# Patient Record
Sex: FEMALE | Race: WHITE | Employment: FULL TIME | URBAN - METROPOLITAN AREA
[De-identification: names, ages, dates, MRNs, and addresses within clinical notes are randomized per-mention and may not be internally consistent; named-entity substitution may affect disease eponyms.]

---

## 2017-10-23 ENCOUNTER — OFFICE VISIT (OUTPATIENT)
Dept: INTERNAL MEDICINE CLINIC | Age: 37
End: 2017-10-23

## 2017-10-23 VITALS
HEART RATE: 78 BPM | WEIGHT: 122.6 LBS | HEIGHT: 66 IN | DIASTOLIC BLOOD PRESSURE: 74 MMHG | SYSTOLIC BLOOD PRESSURE: 112 MMHG | BODY MASS INDEX: 19.7 KG/M2 | OXYGEN SATURATION: 96 %

## 2017-10-23 DIAGNOSIS — Z23 NEED FOR PROPHYLACTIC VACCINATION AGAINST STREPTOCOCCUS PNEUMONIAE (PNEUMOCOCCUS): ICD-10-CM

## 2017-10-23 DIAGNOSIS — F41.9 ANXIETY: ICD-10-CM

## 2017-10-23 DIAGNOSIS — Z76.89 ENCOUNTER TO ESTABLISH CARE: Primary | ICD-10-CM

## 2017-10-23 DIAGNOSIS — Z23 NEED FOR PROPHYLACTIC VACCINATION AGAINST DIPHTHERIA-TETANUS-PERTUSSIS (DTP): ICD-10-CM

## 2017-10-23 DIAGNOSIS — M79.672 LEFT FOOT PAIN: ICD-10-CM

## 2017-10-23 PROCEDURE — 99204 OFFICE O/P NEW MOD 45 MIN: CPT | Performed by: NURSE PRACTITIONER

## 2017-10-23 PROCEDURE — 90715 TDAP VACCINE 7 YRS/> IM: CPT | Performed by: NURSE PRACTITIONER

## 2017-10-23 PROCEDURE — 90472 IMMUNIZATION ADMIN EACH ADD: CPT | Performed by: NURSE PRACTITIONER

## 2017-10-23 PROCEDURE — 90471 IMMUNIZATION ADMIN: CPT | Performed by: NURSE PRACTITIONER

## 2017-10-23 PROCEDURE — 4004F PT TOBACCO SCREEN RCVD TLK: CPT | Performed by: NURSE PRACTITIONER

## 2017-10-23 PROCEDURE — G8420 CALC BMI NORM PARAMETERS: HCPCS | Performed by: NURSE PRACTITIONER

## 2017-10-23 PROCEDURE — G8427 DOCREV CUR MEDS BY ELIG CLIN: HCPCS | Performed by: NURSE PRACTITIONER

## 2017-10-23 PROCEDURE — G8484 FLU IMMUNIZE NO ADMIN: HCPCS | Performed by: NURSE PRACTITIONER

## 2017-10-23 PROCEDURE — 90732 PPSV23 VACC 2 YRS+ SUBQ/IM: CPT | Performed by: NURSE PRACTITIONER

## 2017-10-23 RX ORDER — IBUPROFEN 200 MG
200 TABLET ORAL EVERY 6 HOURS PRN
COMMUNITY
End: 2018-04-23 | Stop reason: SDUPTHER

## 2017-10-23 RX ORDER — BUSPIRONE HYDROCHLORIDE 10 MG/1
10 TABLET ORAL 2 TIMES DAILY
Qty: 60 TABLET | Refills: 0 | Status: SHIPPED | OUTPATIENT
Start: 2017-10-23 | End: 2017-12-29 | Stop reason: SDUPTHER

## 2017-10-23 ASSESSMENT — PATIENT HEALTH QUESTIONNAIRE - PHQ9
2. FEELING DOWN, DEPRESSED OR HOPELESS: 0
SUM OF ALL RESPONSES TO PHQ9 QUESTIONS 1 & 2: 0
SUM OF ALL RESPONSES TO PHQ QUESTIONS 1-9: 0
1. LITTLE INTEREST OR PLEASURE IN DOING THINGS: 0

## 2017-10-23 NOTE — PATIENT INSTRUCTIONS
Patient Education        Foot Pain: Care Instructions  Your Care Instructions  Foot injuries that cause pain and swelling are fairly common. Almost all sports or home repair projects can cause a misstep that ends up as foot pain. Normal wear and tear, especially as you get older, also can cause foot pain. Most minor foot injuries will heal on their own, and home treatment is usually all you need to do. If you have a severe injury, you may need tests and treatment. Follow-up care is a key part of your treatment and safety. Be sure to make and go to all appointments, and call your doctor if you are having problems. Its also a good idea to know your test results and keep a list of the medicines you take. How can you care for yourself at home? · Take pain medicines exactly as directed. ¨ If the doctor gave you a prescription medicine for pain, take it as prescribed. ¨ If you are not taking a prescription pain medicine, ask your doctor if you can take an over-the-counter medicine. · Rest and protect your foot. Take a break from any activity that may cause pain. · Put ice or a cold pack on your foot for 10 to 20 minutes at a time. Put a thin cloth between the ice and your skin. · Prop up the sore foot on a pillow when you ice it or anytime you sit or lie down during the next 3 days. Try to keep it above the level of your heart. This will help reduce swelling. · Your doctor may recommend that you wrap your foot with an elastic bandage. Keep your foot wrapped for as long as your doctor advises. · If your doctor recommends crutches, use them as directed. · Wear roomy footwear. · As soon as pain and swelling end, begin gentle exercises of your foot. Your doctor can tell you which exercises will help. When should you call for help? Call 911 anytime you think you may need emergency care. For example, call if:  · Your foot turns pale, white, blue, or cold.   Call your doctor now or seek immediate medical care if:  · You cannot move or stand on your foot. · Your foot looks twisted or out of its normal position. · Your foot is not stable when you step down. · You have signs of infection, such as:  ¨ Increased pain, swelling, warmth, or redness. ¨ Red streaks leading from the sore area. ¨ Pus draining from a place on your foot. ¨ A fever. · Your foot is numb or tingly. Watch closely for changes in your health, and be sure to contact your doctor if:  · You do not get better as expected. · You have bruises from an injury that last longer than 2 weeks. Where can you learn more? Go to [email or web address] [email or web address] org and sign in to your [employer] account. Enter R878 in the B[employer] box to learn more about \"Foot Pain: Care Instructions. \"     If you do not have an account, please click on the \"Sign Up Now\" link. Current as of: March 21, 2017  Content Version: 11.3  © 6330-4278 [employer]. Care instructions adapted under license by [facility] ([facility]). If you have questions about a medical condition or this instruction, always ask your healthcare professional. [place] [address] any warranty or liability for your use of this information.

## 2017-10-30 ENCOUNTER — OFFICE VISIT (OUTPATIENT)
Dept: ORTHOPEDIC SURGERY | Age: 37
End: 2017-10-30

## 2017-10-30 VITALS — WEIGHT: 120 LBS | RESPIRATION RATE: 16 BRPM | HEIGHT: 65 IN | BODY MASS INDEX: 19.99 KG/M2

## 2017-10-30 DIAGNOSIS — M72.2 PLANTAR FASCIITIS: Primary | ICD-10-CM

## 2017-10-30 DIAGNOSIS — R52 PAIN: ICD-10-CM

## 2017-10-30 PROCEDURE — G8420 CALC BMI NORM PARAMETERS: HCPCS | Performed by: PHYSICIAN ASSISTANT

## 2017-10-30 PROCEDURE — 73630 X-RAY EXAM OF FOOT: CPT | Performed by: PHYSICIAN ASSISTANT

## 2017-10-30 PROCEDURE — G8484 FLU IMMUNIZE NO ADMIN: HCPCS | Performed by: PHYSICIAN ASSISTANT

## 2017-10-30 PROCEDURE — G8427 DOCREV CUR MEDS BY ELIG CLIN: HCPCS | Performed by: PHYSICIAN ASSISTANT

## 2017-10-30 PROCEDURE — 99242 OFF/OP CONSLTJ NEW/EST SF 20: CPT | Performed by: PHYSICIAN ASSISTANT

## 2017-10-30 ASSESSMENT — ENCOUNTER SYMPTOMS
RESPIRATORY NEGATIVE: 1
EYES NEGATIVE: 1
GASTROINTESTINAL NEGATIVE: 1

## 2017-10-30 NOTE — PROGRESS NOTES
above-      PAST HISTORY:   Unless otherwise specified in this note, the past history is reviewed today with the patient and is as follows-    No Known Allergies    Current Outpatient Prescriptions   Medication Sig Dispense Refill    ibuprofen (ADVIL;MOTRIN) 200 MG tablet Take 200 mg by mouth every 6 hours as needed for Pain      busPIRone (BUSPAR) 10 MG tablet Take 1 tablet by mouth 2 times daily 60 tablet 0     No current facility-administered medications for this visit. Past Medical History:   Diagnosis Date    Bone spur 04/2017    left heel of foot    Migraine        Past Surgical History:   Procedure Laterality Date    DILATION AND CURETTAGE OF UTERUS  02/2017       Family History   Problem Relation Age of Onset    No Known Problems Mother     No Known Problems Father     No Known Problems Sister     No Known Problems Brother        Social History     Social History    Marital status: Single     Spouse name: N/A    Number of children: N/A    Years of education: N/A     Social History Main Topics    Smoking status: Current Every Day Smoker     Packs/day: 1.00     Types: Cigarettes     Start date: 1997    Smokeless tobacco: Never Used    Alcohol use Yes      Comment: social-pt states was a heavy drinker    Drug use: No    Sexual activity: Yes     Partners: Male     Other Topics Concern    None     Social History Narrative    None         ORTHOPEDIC CONSTITUTION EXAM:     Vital Signs:  Resp 16   Ht 5' 5\" (1.651 m)   Wt 120 lb (54.4 kg)   LMP 10/09/2017 (Approximate)   BMI 19.97 kg/m²     Constitution:  Generally, the patient is [x] alert, [x] appears stated age, and [x] in no distress. General body habitus is:   []Cachectic  []Thin  []Normal  []Overweight  []Obese  []Morbidly Obese     Psychiatric:   Judgement and insight is:  [x]Good    []Poor  Oriented to:  [x]person, [x]place, [x]time.   Mood for circumstances is:  [x]Appropriate   []Inappropriate    Gait and Station:   Gait is: [x]Normal  []Antalgic   []Trendelenburg   []Wide   []Unsteady   []Other:  Assistive Device:  []Cane    []Crutches    []Walker    []Wheelchair    []Gurney  Weight bearing on injured extremity:  [x]Is able   []Is not able      ORTHOPEDIC FOOT EXAM:     FOOT EXAM:  [] Right [x] Left  Circulation:  [x] The limb is warm and well perfused. [x] Capillary refill is intact. [] Edema:    Inspection:   [x] Skin intact without abrasion or lacerations. [x] Normal ankle / foot alignment:  [] Abnormal alignment:  [] Ecchymosis:  [] Abrasion:  [] Laceration:   [] Scar / Surgical incision:  [] Orthopedic appliance:     Range of Motion:  [x] Normal Foot & Ankle AROM     [x] Normal Foot & Ankle PROM  [] Deferred due to fracture  Active Great Toe Dorsiflexion:  []AROM = PROM   Active Great Toe Plantarflexion:  []AROM = PROM   Active Foot-Ankle Supination:  []AROM = PROM   Active Foot-Ankle Pronation:  []AROM = PROM   Passive Great Toe Dorsiflexion:  []AROM = PROM   Passive Great Toe Plantarflexion:  []AROM = PROM   Passive Foot-Ankle Supination:  []AROM = PROM   Passive Foot-Ankle Pronation:  []AROM = PROM     Motor Function:  [x] No gross motor weakness of ankle [x] No gross motor weakness of great toe  [] Motor weakness:     Neurologic:  - Sensation to light touch intact. - Deep tendon reflexes intact. - Coordination / proprioception intact.       Palpation: (Not tested if not marked)     Normal Tenderness Swelling Crepitation Calor   Medial malleolus - - - - -   Lateral malleolus - - - - -   Peroneal tendons - - - - -   Posterior tibialis tendons - - - - -   Deltoid ligament - - - - -   ATFL - - - - -   Achilles tendon: - - - - -   Achilles insertion: - - - - -   Retrocalcaneal bursa: - - - - -   Calcaneus: tender at site of tendon insertion - - - - -   Talus: - - - - -   Base of 5th metatarsal: - - - - -   Metatarsal shaft #___: - - - - -   Metatarsal head #___: - - - - -   MTP Joint #___: - - - - -   Toe #___: - - - - - Other: - - - - -     Provocative Tests: (Not tested if not marked)   Negative Positive Findings   Ramirez Block Test -    Dorsiflexion-Eversion Test -    Drawer Test -    External Rotation Stress Test -    Inversion Stress Test: -    Silfverskiold test -    Single Heel Rise -    Squeeze Test -    Faustin Test -    Tinel's Sign -      Contralateral Examination:  Examination of the contralateral side is performed for comparison. Unless otherwise specified above, examination of the area does not show any tenderness, deformity or injury. Range of motion is unremarkable. There is no gross instability. There are no rashes, ulcerations or lesions. Strength and tone are normal.      MEDICAL DATA:   Outside record review:  office notes    Imaging: Foot x-ray:  3 view(s) of the left foot were obtained and reviewed and show small osteophyte spur on calcaneus. No fractures or acute deformities noted. ASSESSMENT:     1. Plantar fasciitis     2. Pain  XR FOOT LEFT (MIN 3 VIEWS)         PLAN:   · Diagnostic and treatment options discussed. Diagnosis explained. Natural history discussed. Patient's questions answered. · Physical Therapy ordered. · Fill a 1 or 2 liter bottle with water and freeze it, then roll your foot 20 minutes, 2-3 times daily. · Continue ibuprofen and add Tylenol for pain. · May use heel cups for support. · Follow-up in 4 weeks. Add Tylenol (also known as acetaminophen) as needed   Take 2 extra strength (500 mg) or 3 regular strength (325 mg) 3-4 times a day  It is OK to take Tylenol in conjunction with NSAID's (Advil, Aleve, Naprosyn, etc)  If taking other medications that have acetaminophen ensure total acetaminophen dose for any 24 period is less than 4,000 mg    We greatly appreciate the referral from Brandee Funes CNP, and will send a copy of this note to her office.     Brian Evans PA-C

## 2017-12-29 ENCOUNTER — OFFICE VISIT (OUTPATIENT)
Dept: INTERNAL MEDICINE CLINIC | Age: 37
End: 2017-12-29

## 2017-12-29 VITALS — HEART RATE: 95 BPM | OXYGEN SATURATION: 98 % | DIASTOLIC BLOOD PRESSURE: 76 MMHG | SYSTOLIC BLOOD PRESSURE: 104 MMHG

## 2017-12-29 DIAGNOSIS — F41.9 ANXIETY: ICD-10-CM

## 2017-12-29 DIAGNOSIS — J01.00 ACUTE NON-RECURRENT MAXILLARY SINUSITIS: Primary | ICD-10-CM

## 2017-12-29 PROCEDURE — G8427 DOCREV CUR MEDS BY ELIG CLIN: HCPCS | Performed by: NURSE PRACTITIONER

## 2017-12-29 PROCEDURE — G8420 CALC BMI NORM PARAMETERS: HCPCS | Performed by: NURSE PRACTITIONER

## 2017-12-29 PROCEDURE — 4004F PT TOBACCO SCREEN RCVD TLK: CPT | Performed by: NURSE PRACTITIONER

## 2017-12-29 PROCEDURE — 99213 OFFICE O/P EST LOW 20 MIN: CPT | Performed by: NURSE PRACTITIONER

## 2017-12-29 PROCEDURE — G8484 FLU IMMUNIZE NO ADMIN: HCPCS | Performed by: NURSE PRACTITIONER

## 2017-12-29 RX ORDER — FLUTICASONE PROPIONATE 50 MCG
1 SPRAY, SUSPENSION (ML) NASAL DAILY
Qty: 1 BOTTLE | Refills: 3 | Status: SHIPPED | OUTPATIENT
Start: 2017-12-29 | End: 2018-04-23 | Stop reason: SDUPTHER

## 2017-12-29 RX ORDER — AMOXICILLIN 500 MG/1
500 CAPSULE ORAL 2 TIMES DAILY
Qty: 20 CAPSULE | Refills: 0 | Status: SHIPPED | OUTPATIENT
Start: 2017-12-29 | End: 2018-01-08

## 2017-12-29 RX ORDER — BUSPIRONE HYDROCHLORIDE 10 MG/1
10 TABLET ORAL 2 TIMES DAILY
Qty: 60 TABLET | Refills: 0 | Status: SHIPPED | OUTPATIENT
Start: 2017-12-29 | End: 2018-04-23 | Stop reason: SDUPTHER

## 2017-12-29 NOTE — PROGRESS NOTES
Tyrone Guayamalokesh [facility]  1980  12/29/17    SUBJECTIVE:      Chief Complaint   Patient presents with    Sinus Problem     x 3 weeks, facial pressure       HPI:   Juanis Donis is a 40 y.o. female who presents for evaluation of sinus pain. Symptoms include: congestion, facial pain, headaches, post nasal drip and sore throat. Onset of symptoms was 3 weeks ago. Symptoms have been unchanged since that time. Past history is significant for no history of pneumonia or bronchitis. Patient is a non-smoker. Review of Systems    OBJECTIVE:      /76 (Site: Left Arm, Position: Sitting, Cuff Size: Medium Adult)   Pulse 95   SpO2 98%     Physical Exam   Constitutional: She appears well-developed and well-nourished. HENT:   Right Ear: External ear normal.   Left Ear: External ear normal.   Nose: Rhinorrhea present. Right sinus exhibits maxillary sinus tenderness. Left sinus exhibits maxillary sinus tenderness. Mouth/Throat: No oropharyngeal exudate. Eyes: Conjunctivae are normal. No scleral icterus. Cardiovascular: Normal rate, regular rhythm and normal heart sounds. No murmur heard. Pulmonary/Chest: Effort normal and breath sounds normal. No respiratory distress. She has no wheezes. Lymphadenopathy:     She has no cervical adenopathy. Neurological: She is alert. ASSESSMENT:    1. Acute non-recurrent maxillary sinusitis    2. Anxiety      PLAN:    Jerson Kaur was seen today for sinus problem. Suspect bacterial infection. Will rx atb. Also discussed symptomatic therapy: push fluids, rest, nasal saline rinse, and flonase. Patient will call or return to clinic prn if these symptoms worsen or fail to improve as anticipated. Diagnoses and all orders for this visit:    Acute non-recurrent maxillary sinusitis  -     amoxicillin (AMOXIL) 500 MG capsule;  Take 1 capsule by mouth 2 times daily for 10 days  -     fluticasone (FLONASE) 50 MCG/ACT nasal spray; 1 spray by Nasal route daily    Anxiety  - busPIRone (BUSPAR) 10 MG tablet;  Take 1 tablet by mouth 2 times daily

## 2017-12-29 NOTE — LETTER
[facility]  [address]  Phone: [phone]  Fax: [phone]    Ling Callahan NP        December 29, 2017     Patient: Heidi Alford   YOB: 1980   Date of Visit: 12/29/2017       To Whom it May Concern:    Karan Johnson was seen in my clinic on 12/29/2017. She may return to work on 12/31/17. If you have any questions or concerns, please don't hesitate to call.     Sincerely,           Ling Callahan NP

## 2018-01-17 ENCOUNTER — HOSPITAL ENCOUNTER (OUTPATIENT)
Dept: OTHER | Age: 38
Discharge: HOME | End: 2018-01-17
Attending: FAMILY MEDICINE | Admitting: CLINIC/CENTER

## 2018-01-18 LAB
RAPID INFLUENZA  B AGN: NEGATIVE
RAPID INFLUENZA A AGN: NEGATIVE

## 2018-04-23 ENCOUNTER — OFFICE VISIT (OUTPATIENT)
Dept: INTERNAL MEDICINE CLINIC | Age: 38
End: 2018-04-23

## 2018-04-23 VITALS — HEART RATE: 93 BPM | DIASTOLIC BLOOD PRESSURE: 74 MMHG | OXYGEN SATURATION: 98 % | SYSTOLIC BLOOD PRESSURE: 110 MMHG

## 2018-04-23 DIAGNOSIS — M77.30 BONE SPUR ON POSTERIOR PORTION OF CALCANEUS: ICD-10-CM

## 2018-04-23 DIAGNOSIS — F41.9 ANXIETY: Primary | ICD-10-CM

## 2018-04-23 DIAGNOSIS — K21.9 GASTROESOPHAGEAL REFLUX DISEASE WITHOUT ESOPHAGITIS: ICD-10-CM

## 2018-04-23 PROCEDURE — 99213 OFFICE O/P EST LOW 20 MIN: CPT | Performed by: NURSE PRACTITIONER

## 2018-04-23 PROCEDURE — G8420 CALC BMI NORM PARAMETERS: HCPCS | Performed by: NURSE PRACTITIONER

## 2018-04-23 PROCEDURE — 4004F PT TOBACCO SCREEN RCVD TLK: CPT | Performed by: NURSE PRACTITIONER

## 2018-04-23 PROCEDURE — G8427 DOCREV CUR MEDS BY ELIG CLIN: HCPCS | Performed by: NURSE PRACTITIONER

## 2018-04-23 RX ORDER — FLUTICASONE PROPIONATE 50 MCG
1 SPRAY, SUSPENSION (ML) NASAL DAILY
Qty: 1 BOTTLE | Refills: 3 | Status: SHIPPED | OUTPATIENT
Start: 2018-04-23 | End: 2019-11-11 | Stop reason: SDUPTHER

## 2018-04-23 RX ORDER — RANITIDINE 150 MG/1
150 TABLET ORAL 2 TIMES DAILY
Qty: 60 TABLET | Refills: 3 | Status: SHIPPED | OUTPATIENT
Start: 2018-04-23 | End: 2019-11-11

## 2018-04-23 RX ORDER — HYDROXYZINE HYDROCHLORIDE 25 MG/1
25 TABLET, FILM COATED ORAL 3 TIMES DAILY PRN
Qty: 30 TABLET | Refills: 1 | Status: SHIPPED | OUTPATIENT
Start: 2018-04-23 | End: 2018-05-03

## 2018-04-23 RX ORDER — BUSPIRONE HYDROCHLORIDE 5 MG/1
5 TABLET ORAL 2 TIMES DAILY
Qty: 60 TABLET | Refills: 3 | Status: SHIPPED | OUTPATIENT
Start: 2018-04-23 | End: 2019-11-11

## 2018-04-23 RX ORDER — IBUPROFEN 200 MG
200 TABLET ORAL EVERY 6 HOURS PRN
Qty: 120 TABLET | Refills: 1 | Status: SHIPPED | OUTPATIENT
Start: 2018-04-23 | End: 2019-11-11

## 2019-03-16 ENCOUNTER — HOSPITAL ENCOUNTER (OUTPATIENT)
Age: 39
Setting detail: SPECIMEN
Discharge: HOME | End: 2019-03-16
Payer: MEDICAID

## 2019-03-16 PROCEDURE — 87804 INFLUENZA ASSAY W/OPTIC: CPT

## 2019-03-18 LAB
RAPID INFLUENZA  B AGN: NEGATIVE
RAPID INFLUENZA A AGN: NEGATIVE

## 2019-03-21 ENCOUNTER — HOSPITAL ENCOUNTER (EMERGENCY)
Age: 39
Discharge: HOME | End: 2019-03-21
Attending: EMERGENCY MEDICINE
Payer: MEDICAID

## 2019-03-21 ENCOUNTER — APPOINTMENT (OUTPATIENT)
Dept: GENERAL RADIOLOGY | Age: 39
End: 2019-03-21
Payer: MEDICAID

## 2019-03-21 VITALS
BODY MASS INDEX: 19.29 KG/M2 | HEART RATE: 82 BPM | DIASTOLIC BLOOD PRESSURE: 85 MMHG | SYSTOLIC BLOOD PRESSURE: 129 MMHG | WEIGHT: 120 LBS | TEMPERATURE: 97.9 F | RESPIRATION RATE: 19 BRPM | HEIGHT: 66 IN | OXYGEN SATURATION: 98 %

## 2019-03-21 DIAGNOSIS — J40 BRONCHITIS: Primary | ICD-10-CM

## 2019-03-21 DIAGNOSIS — H66.003 ACUTE SUPPURATIVE OTITIS MEDIA OF BOTH EARS WITHOUT SPONTANEOUS RUPTURE OF TYMPANIC MEMBRANES, RECURRENCE NOT SPECIFIED: ICD-10-CM

## 2019-03-21 PROCEDURE — 71046 X-RAY EXAM CHEST 2 VIEWS: CPT

## 2019-03-21 PROCEDURE — 93005 ELECTROCARDIOGRAM TRACING: CPT | Performed by: EMERGENCY MEDICINE

## 2019-03-21 PROCEDURE — 99283 EMERGENCY DEPT VISIT LOW MDM: CPT

## 2019-03-21 PROCEDURE — 93010 ELECTROCARDIOGRAM REPORT: CPT | Performed by: INTERNAL MEDICINE

## 2019-03-21 RX ORDER — AMOXICILLIN AND CLAVULANATE POTASSIUM 875; 125 MG/1; MG/1
1 TABLET, FILM COATED ORAL 2 TIMES DAILY
Qty: 14 TABLET | Refills: 0 | Status: SHIPPED | OUTPATIENT
Start: 2019-03-21 | End: 2019-03-28

## 2019-03-21 RX ORDER — PSEUDOEPHEDRINE HYDROCHLORIDE 30 MG/1
30 TABLET ORAL EVERY 6 HOURS PRN
Qty: 20 TABLET | Refills: 0 | Status: SHIPPED | OUTPATIENT
Start: 2019-03-21 | End: 2019-03-28

## 2019-03-21 RX ORDER — ALBUTEROL SULFATE 90 UG/1
2 AEROSOL, METERED RESPIRATORY (INHALATION) EVERY 4 HOURS PRN
Qty: 1 INHALER | Refills: 1 | Status: SHIPPED | OUTPATIENT
Start: 2019-03-21 | End: 2019-07-17 | Stop reason: SDUPTHER

## 2019-03-21 ASSESSMENT — PAIN SCALES - GENERAL: PAINLEVEL_OUTOF10: 7

## 2019-03-21 ASSESSMENT — PAIN DESCRIPTION - LOCATION: LOCATION: GENERALIZED

## 2019-03-25 LAB
EKG ATRIAL RATE: 85 BPM
EKG DIAGNOSIS: NORMAL
EKG P AXIS: 68 DEGREES
EKG P-R INTERVAL: 120 MS
EKG Q-T INTERVAL: 406 MS
EKG QRS DURATION: 80 MS
EKG QTC CALCULATION (BAZETT): 483 MS
EKG R AXIS: 77 DEGREES
EKG T AXIS: 74 DEGREES
EKG VENTRICULAR RATE: 85 BPM

## 2019-07-17 ENCOUNTER — APPOINTMENT (OUTPATIENT)
Dept: GENERAL RADIOLOGY | Age: 39
End: 2019-07-17
Payer: MEDICAID

## 2019-07-17 ENCOUNTER — HOSPITAL ENCOUNTER (EMERGENCY)
Age: 39
Discharge: HOME | End: 2019-07-17
Attending: EMERGENCY MEDICINE
Payer: MEDICAID

## 2019-07-17 VITALS
RESPIRATION RATE: 16 BRPM | SYSTOLIC BLOOD PRESSURE: 128 MMHG | HEART RATE: 89 BPM | DIASTOLIC BLOOD PRESSURE: 75 MMHG | TEMPERATURE: 97.5 F | OXYGEN SATURATION: 100 %

## 2019-07-17 DIAGNOSIS — J06.9 VIRAL URI WITH COUGH: Primary | ICD-10-CM

## 2019-07-17 PROCEDURE — 6370000000 HC RX 637 (ALT 250 FOR IP): Performed by: EMERGENCY MEDICINE

## 2019-07-17 PROCEDURE — 71046 X-RAY EXAM CHEST 2 VIEWS: CPT

## 2019-07-17 PROCEDURE — 94640 AIRWAY INHALATION TREATMENT: CPT

## 2019-07-17 PROCEDURE — 94761 N-INVAS EAR/PLS OXIMETRY MLT: CPT

## 2019-07-17 PROCEDURE — 99283 EMERGENCY DEPT VISIT LOW MDM: CPT

## 2019-07-17 RX ORDER — IPRATROPIUM BROMIDE AND ALBUTEROL SULFATE 2.5; .5 MG/3ML; MG/3ML
1 SOLUTION RESPIRATORY (INHALATION) ONCE
Status: COMPLETED | OUTPATIENT
Start: 2019-07-17 | End: 2019-07-17

## 2019-07-17 RX ORDER — ALBUTEROL SULFATE 90 UG/1
2 AEROSOL, METERED RESPIRATORY (INHALATION) EVERY 4 HOURS PRN
Qty: 1 INHALER | Refills: 0 | Status: SHIPPED | OUTPATIENT
Start: 2019-07-17 | End: 2019-11-11

## 2019-07-17 RX ORDER — BENZONATATE 100 MG/1
100 CAPSULE ORAL 3 TIMES DAILY PRN
Qty: 15 CAPSULE | Refills: 0 | Status: SHIPPED | OUTPATIENT
Start: 2019-07-17 | End: 2019-07-24

## 2019-07-17 RX ORDER — METHYLPREDNISOLONE 4 MG/1
TABLET ORAL
Qty: 1 KIT | Refills: 0 | Status: SHIPPED | OUTPATIENT
Start: 2019-07-17 | End: 2019-07-23

## 2019-07-17 RX ADMIN — IPRATROPIUM BROMIDE AND ALBUTEROL SULFATE 1 AMPULE: .5; 3 SOLUTION RESPIRATORY (INHALATION) at 08:49

## 2019-07-17 ASSESSMENT — PAIN SCALES - GENERAL: PAINLEVEL_OUTOF10: 6

## 2019-07-17 ASSESSMENT — PAIN DESCRIPTION - PAIN TYPE: TYPE: ACUTE PAIN

## 2019-07-17 NOTE — ED TRIAGE NOTES
Pt to ED for c/o cough starting a week ago. Patient was seen at urgent care, prescribed tesselon pearls and amoxicillin, was dx with bronchitis. States no relief from cough. Patient states she experienced one episode of emesis after taking amoxicillin yesterday.

## 2019-07-17 NOTE — ED NOTES
Discharge instructions reviewed with patient and . Medications and follow up were discussed.  Patient and  denies further questions and verbalizes understanding     Nica RN  07/17/19 [id number]

## 2019-09-18 ENCOUNTER — HOSPITAL ENCOUNTER (EMERGENCY)
Age: 39
Discharge: HOME | End: 2019-09-18
Attending: EMERGENCY MEDICINE
Payer: MEDICAID

## 2019-09-18 ENCOUNTER — APPOINTMENT (OUTPATIENT)
Dept: CT IMAGING | Age: 39
End: 2019-09-18
Payer: MEDICAID

## 2019-09-18 VITALS
HEIGHT: 65 IN | DIASTOLIC BLOOD PRESSURE: 61 MMHG | RESPIRATION RATE: 16 BRPM | SYSTOLIC BLOOD PRESSURE: 104 MMHG | HEART RATE: 65 BPM | TEMPERATURE: 98 F | OXYGEN SATURATION: 97 % | BODY MASS INDEX: 19.99 KG/M2 | WEIGHT: 120 LBS

## 2019-09-18 DIAGNOSIS — A09 DIARRHEA OF INFECTIOUS ORIGIN: Primary | ICD-10-CM

## 2019-09-18 DIAGNOSIS — K92.1 HEMATOCHEZIA: ICD-10-CM

## 2019-09-18 LAB
ALBUMIN SERPL-MCNC: 4.3 GM/DL (ref 3.4–5)
ALP BLD-CCNC: 61 IU/L (ref 40–129)
ALT SERPL-CCNC: 10 U/L (ref 10–40)
ANION GAP SERPL CALCULATED.3IONS-SCNC: 10 MMOL/L (ref 4–16)
AST SERPL-CCNC: 15 IU/L (ref 15–37)
BACTERIA: NEGATIVE /HPF
BASOPHILS ABSOLUTE: 0.1 K/CU MM
BASOPHILS RELATIVE PERCENT: 1 % (ref 0–1)
BILIRUB SERPL-MCNC: 0.4 MG/DL (ref 0–1)
BILIRUBIN URINE: NEGATIVE MG/DL
BLOOD, URINE: ABNORMAL
BUN BLDV-MCNC: 6 MG/DL (ref 6–23)
CALCIUM SERPL-MCNC: 8.6 MG/DL (ref 8.3–10.6)
CHLORIDE BLD-SCNC: 104 MMOL/L (ref 99–110)
CLARITY: CLEAR
CO2: 26 MMOL/L (ref 21–32)
COLOR: ABNORMAL
CREAT SERPL-MCNC: 0.7 MG/DL (ref 0.6–1.1)
DIFFERENTIAL TYPE: ABNORMAL
EOSINOPHILS ABSOLUTE: 0.2 K/CU MM
EOSINOPHILS RELATIVE PERCENT: 2.5 % (ref 0–3)
GFR AFRICAN AMERICAN: >60 ML/MIN/1.73M2
GFR NON-AFRICAN AMERICAN: >60 ML/MIN/1.73M2
GLUCOSE BLD-MCNC: 87 MG/DL (ref 70–99)
GLUCOSE, URINE: NEGATIVE MG/DL
HCG QUALITATIVE: NEGATIVE
HCT VFR BLD CALC: 44.3 % (ref 37–47)
HEMOGLOBIN: 14.4 GM/DL (ref 12.5–16)
IMMATURE NEUTROPHIL %: 0.3 % (ref 0–0.43)
KETONES, URINE: NEGATIVE MG/DL
LEUKOCYTE ESTERASE, URINE: NEGATIVE
LYMPHOCYTES ABSOLUTE: 1.9 K/CU MM
LYMPHOCYTES RELATIVE PERCENT: 31.8 % (ref 24–44)
MCH RBC QN AUTO: 31.8 PG (ref 27–31)
MCHC RBC AUTO-ENTMCNC: 32.5 % (ref 32–36)
MCV RBC AUTO: 97.8 FL (ref 78–100)
MONOCYTES ABSOLUTE: 0.5 K/CU MM
MONOCYTES RELATIVE PERCENT: 8.5 % (ref 0–4)
MUCUS: ABNORMAL HPF
NITRITE URINE, QUANTITATIVE: NEGATIVE
NUCLEATED RBC %: 0 %
PDW BLD-RTO: 13.4 % (ref 11.7–14.9)
PH, URINE: 6 (ref 5–8)
PLATELET # BLD: 224 K/CU MM (ref 140–440)
PMV BLD AUTO: 10.7 FL (ref 7.5–11.1)
POTASSIUM SERPL-SCNC: 4.1 MMOL/L (ref 3.5–5.1)
PROTEIN UA: NEGATIVE MG/DL
RBC # BLD: 4.53 M/CU MM (ref 4.2–5.4)
RBC URINE: 1 /HPF (ref 0–6)
SEGMENTED NEUTROPHILS ABSOLUTE COUNT: 3.3 K/CU MM
SEGMENTED NEUTROPHILS RELATIVE PERCENT: 55.9 % (ref 36–66)
SODIUM BLD-SCNC: 140 MMOL/L (ref 135–145)
SPECIFIC GRAVITY UA: 1 (ref 1–1.03)
SQUAMOUS EPITHELIAL: 2 /HPF
TOTAL IMMATURE NEUTOROPHIL: 0.02 K/CU MM
TOTAL NUCLEATED RBC: 0 K/CU MM
TOTAL PROTEIN: 6.6 GM/DL (ref 6.4–8.2)
TRICHOMONAS: ABNORMAL /HPF
UROBILINOGEN, URINE: NORMAL MG/DL (ref 0.2–1)
WBC # BLD: 5.9 K/CU MM (ref 4–10.5)
WBC UA: <1 /HPF (ref 0–5)

## 2019-09-18 PROCEDURE — 99284 EMERGENCY DEPT VISIT MOD MDM: CPT

## 2019-09-18 PROCEDURE — 6360000004 HC RX CONTRAST MEDICATION: Performed by: EMERGENCY MEDICINE

## 2019-09-18 PROCEDURE — 2580000003 HC RX 258: Performed by: EMERGENCY MEDICINE

## 2019-09-18 PROCEDURE — 80053 COMPREHEN METABOLIC PANEL: CPT

## 2019-09-18 PROCEDURE — 81001 URINALYSIS AUTO W/SCOPE: CPT

## 2019-09-18 PROCEDURE — 85025 COMPLETE CBC W/AUTO DIFF WBC: CPT

## 2019-09-18 PROCEDURE — 74177 CT ABD & PELVIS W/CONTRAST: CPT

## 2019-09-18 PROCEDURE — 84703 CHORIONIC GONADOTROPIN ASSAY: CPT

## 2019-09-18 PROCEDURE — 6360000002 HC RX W HCPCS: Performed by: EMERGENCY MEDICINE

## 2019-09-18 PROCEDURE — 6370000000 HC RX 637 (ALT 250 FOR IP): Performed by: EMERGENCY MEDICINE

## 2019-09-18 PROCEDURE — 96374 THER/PROPH/DIAG INJ IV PUSH: CPT

## 2019-09-18 PROCEDURE — 96375 TX/PRO/DX INJ NEW DRUG ADDON: CPT

## 2019-09-18 RX ORDER — METRONIDAZOLE 500 MG/1
500 TABLET ORAL 3 TIMES DAILY
Qty: 30 TABLET | Refills: 0 | Status: SHIPPED | OUTPATIENT
Start: 2019-09-18 | End: 2019-09-28

## 2019-09-18 RX ORDER — ONDANSETRON 2 MG/ML
4 INJECTION INTRAMUSCULAR; INTRAVENOUS ONCE
Status: COMPLETED | OUTPATIENT
Start: 2019-09-18 | End: 2019-09-18

## 2019-09-18 RX ORDER — PROMETHAZINE HYDROCHLORIDE 25 MG/1
25 TABLET ORAL EVERY 6 HOURS PRN
Qty: 20 TABLET | Refills: 0 | Status: SHIPPED | OUTPATIENT
Start: 2019-09-18 | End: 2019-09-25

## 2019-09-18 RX ORDER — ONDANSETRON 4 MG/1
4 TABLET, ORALLY DISINTEGRATING ORAL EVERY 8 HOURS PRN
Qty: 20 TABLET | Refills: 0 | Status: SHIPPED | OUTPATIENT
Start: 2019-09-18 | End: 2019-11-11

## 2019-09-18 RX ORDER — CIPROFLOXACIN 500 MG/1
500 TABLET, FILM COATED ORAL 2 TIMES DAILY
Qty: 20 TABLET | Refills: 0 | Status: SHIPPED | OUTPATIENT
Start: 2019-09-18 | End: 2019-09-28

## 2019-09-18 RX ORDER — MORPHINE SULFATE 4 MG/ML
4 INJECTION, SOLUTION INTRAMUSCULAR; INTRAVENOUS ONCE
Status: COMPLETED | OUTPATIENT
Start: 2019-09-18 | End: 2019-09-18

## 2019-09-18 RX ORDER — DICYCLOMINE HYDROCHLORIDE 10 MG/1
10 CAPSULE ORAL EVERY 6 HOURS PRN
Qty: 20 CAPSULE | Refills: 0 | Status: SHIPPED | OUTPATIENT
Start: 2019-09-18 | End: 2019-11-11

## 2019-09-18 RX ORDER — AMOXICILLIN AND CLAVULANATE POTASSIUM 875; 125 MG/1; MG/1
1 TABLET, FILM COATED ORAL ONCE
Status: COMPLETED | OUTPATIENT
Start: 2019-09-18 | End: 2019-09-18

## 2019-09-18 RX ORDER — 0.9 % SODIUM CHLORIDE 0.9 %
10 VIAL (ML) INJECTION
Status: COMPLETED | OUTPATIENT
Start: 2019-09-18 | End: 2019-09-18

## 2019-09-18 RX ORDER — DICYCLOMINE HYDROCHLORIDE 10 MG/1
10 CAPSULE ORAL ONCE
Status: COMPLETED | OUTPATIENT
Start: 2019-09-18 | End: 2019-09-18

## 2019-09-18 RX ADMIN — SODIUM CHLORIDE 10 ML: 9 INJECTION, SOLUTION INTRAMUSCULAR; INTRAVENOUS; SUBCUTANEOUS at 11:44

## 2019-09-18 RX ADMIN — ONDANSETRON 4 MG: 2 INJECTION INTRAMUSCULAR; INTRAVENOUS at 10:10

## 2019-09-18 RX ADMIN — MORPHINE SULFATE 4 MG: 4 INJECTION, SOLUTION INTRAMUSCULAR; INTRAVENOUS at 10:10

## 2019-09-18 RX ADMIN — IOPAMIDOL 75 ML: 755 INJECTION, SOLUTION INTRAVENOUS at 11:44

## 2019-09-18 RX ADMIN — DICYCLOMINE HYDROCHLORIDE 10 MG: 10 CAPSULE ORAL at 13:58

## 2019-09-18 RX ADMIN — AMOXICILLIN AND CLAVULANATE POTASSIUM 1 TABLET: 875; 125 TABLET, FILM COATED ORAL at 13:04

## 2019-09-18 ASSESSMENT — PAIN SCALES - GENERAL
PAINLEVEL_OUTOF10: 0
PAINLEVEL_OUTOF10: 7
PAINLEVEL_OUTOF10: 5

## 2019-09-18 ASSESSMENT — PAIN DESCRIPTION - LOCATION: LOCATION: ABDOMEN

## 2019-09-18 ASSESSMENT — PAIN DESCRIPTION - PAIN TYPE: TYPE: ACUTE PAIN

## 2019-09-18 NOTE — LETTER
[facility] Emergency Department  [address] [address]  Phone: [phone]  Fax: [phone]             September 18, 2019    Patient: Brandyn Miller   YOB: 1980   Date of Visit: 9/18/2019       To Whom It May Concern:    Jeison Otero was seen and treated in our emergency department on 9/18/2019. Sudha Callaway was present during visit.      Sincerely,             Signature:__________________________________

## 2019-09-18 NOTE — ED PROVIDER NOTES
Known Problems Mother     No Known Problems Father     No Known Problems Sister     No Known Problems Brother           SOCIAL HISTORY       Social History     Socioeconomic History    Marital status: Single     Spouse name: None    Number of children: None    Years of education: None    Highest education level: None   Occupational History    None   Social Needs    Financial resource strain: None    Food insecurity:     Worry: None     Inability: None    Transportation needs:     Medical: None     Non-medical: None   Tobacco Use    Smoking status: Current Every Day Smoker     Packs/day: 1.00     Types: Cigarettes     Start date: 1997    Smokeless tobacco: Never Used   Substance and Sexual Activity    Alcohol use: Yes     Comment: soc    Drug use: No    Sexual activity: Yes     Partners: Male   Lifestyle    Physical activity:     Days per week: None     Minutes per session: None    Stress: None   Relationships    Social connections:     Talks on phone: None     Gets together: None     Attends Yazidi service: None     Active member of club or organization: None     Attends meetings of clubs or organizations: None     Relationship status: None    Intimate partner violence:     Fear of current or ex partner: None     Emotionally abused: None     Physically abused: None     Forced sexual activity: None   Other Topics Concern    None   Social History Narrative    None       SCREENINGS   [email or web address] Coma Scale  Eye Opening: Spontaneous  Best Verbal Response: Oriented  Best Motor Response: Obeys commands  Estrella Coma Scale Score: 15        PHYSICAL EXAM       ED Triage Vitals   BP Temp Temp Source Pulse Resp SpO2 Height Weight   09/18/19 0936 09/18/19 0936 09/18/19 0936 09/18/19 0936 09/18/19 0936 09/18/19 0936 09/18/19 0943 09/18/19 0943   120/82 98 °F (36.7 °C) Oral 86 16 99 % 5' 5\" (1.651 m) 120 lb (54.4 kg)       Physical Exam  General appearance: Alert, cooperative, no distress, appears stated age. Head:  Normocephalic, without obvious abnormality, atraumatic. HEENT: Mucous membranes moist.  Neck: Full ROM, trachea midline, no JVD  Lungs: No respiratory distress  Cardiovasular: Perfusing extremities  Abdomen: Midline lower abdominal tenderness to palpation, no rebound. More notable in the right lower quadrant. Extremities: Atraumatic, full ROM  Skin: No rashes or lesions to exposed skin  Neurologic: Alert and oriented x3, motor grossly normal, clear speech    DIAGNOSTIC RESULTS     EKG:     RADIOLOGY:   Non-plain film images such as CT, Ultrasound and MRI are read by the radiologist.Plain radiographic images are visualized and preliminarily interpreted by the emergency physician with the below findings:        Interpretation per the Radiologist below, if available at the time of this note:    CT ABDOMEN PELVIS W IV CONTRAST Additional Contrast? None   Final Result   No CT evidence of acute intra-abdominal process. Colonic diverticulosis. EDBEDSIDE ULTRASOUND:   Performed by Ashwin Arriaga - none    LABS:  Labs Reviewed   CBC WITH AUTO DIFFERENTIAL - Abnormal; Notable for the following components:       Result Value    MCH 31.8 (*)     Monocytes % 8.5 (*)     All other components within normal limits   URINE RT REFLEX TO CULTURE - Abnormal; Notable for the following components:    Color, UA STRAW (*)     Blood, Urine SMALL (*)     Mucus, UA RARE (*)     All other components within normal limits   COMPREHENSIVE METABOLIC PANEL W/ REFLEX TO MG FOR LOW K   HCG, SERUM, QUALITATIVE       All other labs were within normal range or not returned as of this dictation.     EMERGENCY DEPARTMENT COURSE and DIFFERENTIAL DIAGNOSIS/MDM:   Vitals:    Vitals:    09/18/19 1148 09/18/19 1157 09/18/19 1202 09/18/19 1233   BP:   118/70 104/61   Pulse:  65     Resp:       Temp:       TempSrc:       SpO2: 100%  100% 97%   Weight:       Height:           MDM  Patient presents with diarrhea,

## 2019-11-11 ENCOUNTER — HOSPITAL ENCOUNTER (OUTPATIENT)
Age: 39
Discharge: HOME | End: 2019-11-11
Payer: MEDICAID

## 2019-11-11 ENCOUNTER — OFFICE VISIT (OUTPATIENT)
Dept: INTERNAL MEDICINE CLINIC | Age: 39
End: 2019-11-11
Payer: MEDICAID

## 2019-11-11 ENCOUNTER — HOSPITAL ENCOUNTER (OUTPATIENT)
Dept: GENERAL RADIOLOGY | Age: 39
Discharge: HOME | End: 2019-11-11
Payer: MEDICAID

## 2019-11-11 VITALS
HEART RATE: 88 BPM | OXYGEN SATURATION: 98 % | BODY MASS INDEX: 20.16 KG/M2 | SYSTOLIC BLOOD PRESSURE: 119 MMHG | WEIGHT: 121 LBS | DIASTOLIC BLOOD PRESSURE: 84 MMHG | HEIGHT: 65 IN

## 2019-11-11 DIAGNOSIS — M79.672 CHRONIC FOOT PAIN, LEFT: ICD-10-CM

## 2019-11-11 DIAGNOSIS — G89.29 CHRONIC FOOT PAIN, LEFT: ICD-10-CM

## 2019-11-11 DIAGNOSIS — F32.A DEPRESSION, UNSPECIFIED DEPRESSION TYPE: ICD-10-CM

## 2019-11-11 DIAGNOSIS — R35.0 URINE FREQUENCY: ICD-10-CM

## 2019-11-11 DIAGNOSIS — F41.9 CHRONIC ANXIETY: ICD-10-CM

## 2019-11-11 DIAGNOSIS — K21.9 GASTROESOPHAGEAL REFLUX DISEASE WITHOUT ESOPHAGITIS: Primary | ICD-10-CM

## 2019-11-11 LAB
BACTERIA: ABNORMAL /HPF
BILIRUBIN URINE: NEGATIVE
BLOOD, URINE: ABNORMAL
CLARITY: CLEAR
COLOR: YELLOW
EPITHELIAL CELLS, UA: 6 /HPF (ref 0–5)
GLUCOSE URINE: NEGATIVE MG/DL
HYALINE CASTS: 5 /LPF (ref 0–8)
KETONES, URINE: NEGATIVE MG/DL
LEUKOCYTE ESTERASE, URINE: ABNORMAL
MICROSCOPIC EXAMINATION: YES
NITRITE, URINE: NEGATIVE
PH UA: 6 (ref 5–8)
PROTEIN UA: NEGATIVE MG/DL
RBC UA: 2 /HPF (ref 0–4)
SPECIFIC GRAVITY UA: 1.02 (ref 1–1.03)
URINE TYPE: ABNORMAL
UROBILINOGEN, URINE: 0.2 E.U./DL
WBC UA: 36 /HPF (ref 0–5)

## 2019-11-11 PROCEDURE — 99204 OFFICE O/P NEW MOD 45 MIN: CPT | Performed by: FAMILY MEDICINE

## 2019-11-11 PROCEDURE — G8427 DOCREV CUR MEDS BY ELIG CLIN: HCPCS | Performed by: FAMILY MEDICINE

## 2019-11-11 PROCEDURE — 73630 X-RAY EXAM OF FOOT: CPT

## 2019-11-11 PROCEDURE — G8420 CALC BMI NORM PARAMETERS: HCPCS | Performed by: FAMILY MEDICINE

## 2019-11-11 PROCEDURE — 4004F PT TOBACCO SCREEN RCVD TLK: CPT | Performed by: FAMILY MEDICINE

## 2019-11-11 PROCEDURE — G8484 FLU IMMUNIZE NO ADMIN: HCPCS | Performed by: FAMILY MEDICINE

## 2019-11-11 RX ORDER — FAMOTIDINE 20 MG/1
20 TABLET, FILM COATED ORAL 2 TIMES DAILY
Qty: 60 TABLET | Refills: 1 | Status: SHIPPED | OUTPATIENT
Start: 2019-11-11 | End: 2019-12-30

## 2019-11-11 RX ORDER — IBUPROFEN 600 MG/1
600 TABLET ORAL 3 TIMES DAILY PRN
Qty: 60 TABLET | Refills: 0 | Status: SHIPPED | OUTPATIENT
Start: 2019-11-11 | End: 2019-12-11

## 2019-11-11 RX ORDER — VENLAFAXINE HYDROCHLORIDE 37.5 MG/1
37.5 CAPSULE, EXTENDED RELEASE ORAL DAILY
Qty: 30 CAPSULE | Refills: 0 | Status: SHIPPED | OUTPATIENT
Start: 2019-11-11 | End: 2020-01-27

## 2019-11-11 RX ORDER — FLUTICASONE PROPIONATE 50 MCG
1 SPRAY, SUSPENSION (ML) NASAL DAILY
Qty: 1 BOTTLE | Refills: 1 | Status: SHIPPED | OUTPATIENT
Start: 2019-11-11 | End: 2020-01-27 | Stop reason: SDUPTHER

## 2019-11-11 ASSESSMENT — ENCOUNTER SYMPTOMS
ABDOMINAL PAIN: 0
DIARRHEA: 1
CHEST TIGHTNESS: 0
EYES NEGATIVE: 1
BACK PAIN: 0
BLOOD IN STOOL: 0
SHORTNESS OF BREATH: 0
NAUSEA: 0
CONSTIPATION: 0
COUGH: 0

## 2019-11-11 ASSESSMENT — PATIENT HEALTH QUESTIONNAIRE - PHQ9
SUM OF ALL RESPONSES TO PHQ9 QUESTIONS 1 & 2: 1
2. FEELING DOWN, DEPRESSED OR HOPELESS: 1
SUM OF ALL RESPONSES TO PHQ QUESTIONS 1-9: 1
1. LITTLE INTEREST OR PLEASURE IN DOING THINGS: 0
SUM OF ALL RESPONSES TO PHQ QUESTIONS 1-9: 1

## 2019-11-12 LAB — URINE CULTURE, ROUTINE: NORMAL

## 2019-12-11 ENCOUNTER — HOSPITAL ENCOUNTER (EMERGENCY)
Age: 39
Discharge: HOME | End: 2019-12-11
Payer: MEDICAID

## 2019-12-11 VITALS
BODY MASS INDEX: 19.29 KG/M2 | WEIGHT: 120 LBS | TEMPERATURE: 98.5 F | DIASTOLIC BLOOD PRESSURE: 106 MMHG | HEIGHT: 66 IN | SYSTOLIC BLOOD PRESSURE: 140 MMHG | OXYGEN SATURATION: 97 % | RESPIRATION RATE: 16 BRPM | HEART RATE: 98 BPM

## 2019-12-11 DIAGNOSIS — J01.90 ACUTE NON-RECURRENT SINUSITIS, UNSPECIFIED LOCATION: Primary | ICD-10-CM

## 2019-12-11 PROCEDURE — 99283 EMERGENCY DEPT VISIT LOW MDM: CPT

## 2019-12-11 RX ORDER — ETODOLAC 600 MG/1
600 TABLET, EXTENDED RELEASE ORAL DAILY
COMMUNITY
End: 2020-09-08

## 2019-12-11 RX ORDER — AMOXICILLIN AND CLAVULANATE POTASSIUM 875; 125 MG/1; MG/1
1 TABLET, FILM COATED ORAL 2 TIMES DAILY
Qty: 14 TABLET | Refills: 0 | Status: SHIPPED | OUTPATIENT
Start: 2019-12-11 | End: 2019-12-18

## 2019-12-11 ASSESSMENT — PAIN DESCRIPTION - LOCATION: LOCATION: FACE

## 2019-12-11 ASSESSMENT — PAIN DESCRIPTION - DESCRIPTORS: DESCRIPTORS: PRESSURE

## 2019-12-11 ASSESSMENT — PAIN SCALES - GENERAL: PAINLEVEL_OUTOF10: 10

## 2019-12-11 ASSESSMENT — PAIN DESCRIPTION - PAIN TYPE: TYPE: ACUTE PAIN

## 2019-12-11 ASSESSMENT — PAIN DESCRIPTION - FREQUENCY: FREQUENCY: CONTINUOUS

## 2019-12-26 ENCOUNTER — HOSPITAL ENCOUNTER (EMERGENCY)
Age: 39
Discharge: HOME | End: 2019-12-27
Attending: EMERGENCY MEDICINE
Payer: MEDICAID

## 2019-12-26 ENCOUNTER — APPOINTMENT (OUTPATIENT)
Dept: GENERAL RADIOLOGY | Age: 39
End: 2019-12-26
Payer: MEDICAID

## 2019-12-26 DIAGNOSIS — B34.9 VIRAL ILLNESS: Primary | ICD-10-CM

## 2019-12-26 LAB
BACTERIA: NEGATIVE /HPF
BILIRUBIN URINE: NEGATIVE MG/DL
BLOOD, URINE: ABNORMAL
CLARITY: ABNORMAL
COLOR: ABNORMAL
GLUCOSE, URINE: NEGATIVE MG/DL
KETONES, URINE: NEGATIVE MG/DL
LEUKOCYTE ESTERASE, URINE: NEGATIVE
NITRITE URINE, QUANTITATIVE: NEGATIVE
PH, URINE: 7 (ref 5–8)
PROTEIN UA: NEGATIVE MG/DL
RAPID INFLUENZA  B AGN: NEGATIVE
RAPID INFLUENZA A AGN: NEGATIVE
RBC URINE: <1 /HPF (ref 0–6)
SPECIFIC GRAVITY UA: 1 (ref 1–1.03)
SQUAMOUS EPITHELIAL: 1 /HPF
TRICHOMONAS: ABNORMAL /HPF
UROBILINOGEN, URINE: NORMAL MG/DL (ref 0.2–1)
WBC UA: ABNORMAL /HPF (ref 0–5)

## 2019-12-26 PROCEDURE — 87804 INFLUENZA ASSAY W/OPTIC: CPT

## 2019-12-26 PROCEDURE — 6370000000 HC RX 637 (ALT 250 FOR IP): Performed by: EMERGENCY MEDICINE

## 2019-12-26 PROCEDURE — 2580000003 HC RX 258: Performed by: EMERGENCY MEDICINE

## 2019-12-26 PROCEDURE — 87430 STREP A AG IA: CPT

## 2019-12-26 PROCEDURE — 99283 EMERGENCY DEPT VISIT LOW MDM: CPT

## 2019-12-26 PROCEDURE — 87081 CULTURE SCREEN ONLY: CPT

## 2019-12-26 PROCEDURE — 71046 X-RAY EXAM CHEST 2 VIEWS: CPT

## 2019-12-26 PROCEDURE — 81001 URINALYSIS AUTO W/SCOPE: CPT

## 2019-12-26 RX ORDER — 0.9 % SODIUM CHLORIDE 0.9 %
1000 INTRAVENOUS SOLUTION INTRAVENOUS ONCE
Status: COMPLETED | OUTPATIENT
Start: 2019-12-26 | End: 2019-12-27

## 2019-12-26 RX ORDER — IBUPROFEN 600 MG/1
600 TABLET ORAL ONCE
Status: COMPLETED | OUTPATIENT
Start: 2019-12-26 | End: 2019-12-26

## 2019-12-26 RX ORDER — 0.9 % SODIUM CHLORIDE 0.9 %
1000 INTRAVENOUS SOLUTION INTRAVENOUS ONCE
Status: COMPLETED | OUTPATIENT
Start: 2019-12-26 | End: 2019-12-26

## 2019-12-26 RX ORDER — ACETAMINOPHEN 500 MG
1000 TABLET ORAL ONCE
Status: COMPLETED | OUTPATIENT
Start: 2019-12-26 | End: 2019-12-26

## 2019-12-26 RX ADMIN — SODIUM CHLORIDE 1000 ML: 9 INJECTION, SOLUTION INTRAVENOUS at 20:37

## 2019-12-26 RX ADMIN — SODIUM CHLORIDE 1000 ML: 9 INJECTION, SOLUTION INTRAVENOUS at 23:04

## 2019-12-26 RX ADMIN — ACETAMINOPHEN 1000 MG: 500 TABLET ORAL at 21:50

## 2019-12-26 RX ADMIN — IBUPROFEN 600 MG: 600 TABLET ORAL at 20:37

## 2019-12-26 ASSESSMENT — PAIN SCALES - GENERAL
PAINLEVEL_OUTOF10: 10

## 2019-12-26 ASSESSMENT — PAIN DESCRIPTION - PAIN TYPE: TYPE: ACUTE PAIN

## 2019-12-26 ASSESSMENT — PAIN DESCRIPTION - LOCATION: LOCATION: GENERALIZED

## 2019-12-27 VITALS
DIASTOLIC BLOOD PRESSURE: 55 MMHG | BODY MASS INDEX: 19.29 KG/M2 | HEART RATE: 98 BPM | RESPIRATION RATE: 19 BRPM | OXYGEN SATURATION: 98 % | WEIGHT: 120 LBS | SYSTOLIC BLOOD PRESSURE: 103 MMHG | HEIGHT: 66 IN | TEMPERATURE: 97.6 F

## 2019-12-27 RX ORDER — IBUPROFEN 600 MG/1
600 TABLET ORAL EVERY 6 HOURS PRN
Qty: 20 TABLET | Refills: 0 | Status: SHIPPED | OUTPATIENT
Start: 2019-12-27 | End: 2019-12-30

## 2019-12-29 LAB
CULTURE: ABNORMAL
CULTURE: ABNORMAL
Lab: ABNORMAL
SPECIMEN: ABNORMAL
STREP A DIRECT SCREEN: NEGATIVE

## 2019-12-30 ENCOUNTER — OFFICE VISIT (OUTPATIENT)
Dept: INTERNAL MEDICINE CLINIC | Age: 39
End: 2019-12-30
Payer: MEDICAID

## 2019-12-30 VITALS
BODY MASS INDEX: 18.71 KG/M2 | WEIGHT: 116.4 LBS | HEART RATE: 91 BPM | SYSTOLIC BLOOD PRESSURE: 118 MMHG | DIASTOLIC BLOOD PRESSURE: 87 MMHG | TEMPERATURE: 97.8 F | OXYGEN SATURATION: 97 % | HEIGHT: 66 IN

## 2019-12-30 PROCEDURE — 4004F PT TOBACCO SCREEN RCVD TLK: CPT | Performed by: FAMILY MEDICINE

## 2019-12-30 PROCEDURE — G8427 DOCREV CUR MEDS BY ELIG CLIN: HCPCS | Performed by: FAMILY MEDICINE

## 2019-12-30 PROCEDURE — G8484 FLU IMMUNIZE NO ADMIN: HCPCS | Performed by: FAMILY MEDICINE

## 2019-12-30 PROCEDURE — 99213 OFFICE O/P EST LOW 20 MIN: CPT | Performed by: FAMILY MEDICINE

## 2019-12-30 PROCEDURE — G8420 CALC BMI NORM PARAMETERS: HCPCS | Performed by: FAMILY MEDICINE

## 2019-12-30 RX ORDER — AMOXICILLIN 500 MG/1
500 CAPSULE ORAL 3 TIMES DAILY
Qty: 30 CAPSULE | Refills: 0 | Status: SHIPPED | OUTPATIENT
Start: 2019-12-30 | End: 2020-01-09

## 2019-12-30 RX ORDER — FAMOTIDINE 20 MG/1
TABLET, FILM COATED ORAL
Qty: 60 TABLET | Refills: 0 | Status: SHIPPED | OUTPATIENT
Start: 2019-12-30 | End: 2020-01-27 | Stop reason: SDUPTHER

## 2019-12-30 NOTE — PROGRESS NOTES
(AMOXIL) 500 MG capsule     Sig: Take 1 capsule by mouth 3 times daily for 10 days     Dispense:  30 capsule     Refill:  0   Start Amoxicillin  Can use Tylenol if needed  Keep f/u appointment         Return if symptoms worsen or fail to improve.     Electronically Signed by Linda Wells DO

## 2020-01-01 ASSESSMENT — ENCOUNTER SYMPTOMS
COUGH: 0
ABDOMINAL PAIN: 0
NAUSEA: 0
CHEST TIGHTNESS: 0
SHORTNESS OF BREATH: 0
RHINORRHEA: 1
BACK PAIN: 0
SORE THROAT: 1

## 2020-01-27 ENCOUNTER — OFFICE VISIT (OUTPATIENT)
Dept: INTERNAL MEDICINE CLINIC | Age: 40
End: 2020-01-27
Payer: MEDICAID

## 2020-01-27 VITALS
BODY MASS INDEX: 18.8 KG/M2 | HEART RATE: 104 BPM | SYSTOLIC BLOOD PRESSURE: 114 MMHG | WEIGHT: 117 LBS | DIASTOLIC BLOOD PRESSURE: 78 MMHG | OXYGEN SATURATION: 98 % | HEIGHT: 66 IN

## 2020-01-27 LAB
BACTERIA: ABNORMAL /HPF
BILIRUBIN URINE: NEGATIVE
BLOOD, URINE: ABNORMAL
CLARITY: ABNORMAL
COLOR: YELLOW
COMMENT UA: ABNORMAL
EPITHELIAL CELLS, UA: 9 /HPF (ref 0–5)
GLUCOSE URINE: NEGATIVE MG/DL
HYALINE CASTS: 1 /LPF (ref 0–8)
KETONES, URINE: NEGATIVE MG/DL
LEUKOCYTE ESTERASE, URINE: ABNORMAL
MICROSCOPIC EXAMINATION: YES
NITRITE, URINE: NEGATIVE
PH UA: 6 (ref 5–8)
PROTEIN UA: NEGATIVE MG/DL
RBC UA: 5 /HPF (ref 0–4)
SPECIFIC GRAVITY UA: 1.02 (ref 1–1.03)
URINE TYPE: ABNORMAL
UROBILINOGEN, URINE: 0.2 E.U./DL
WBC UA: 11 /HPF (ref 0–5)

## 2020-01-27 PROCEDURE — 99214 OFFICE O/P EST MOD 30 MIN: CPT | Performed by: FAMILY MEDICINE

## 2020-01-27 PROCEDURE — G8420 CALC BMI NORM PARAMETERS: HCPCS | Performed by: FAMILY MEDICINE

## 2020-01-27 PROCEDURE — G8484 FLU IMMUNIZE NO ADMIN: HCPCS | Performed by: FAMILY MEDICINE

## 2020-01-27 PROCEDURE — 4004F PT TOBACCO SCREEN RCVD TLK: CPT | Performed by: FAMILY MEDICINE

## 2020-01-27 PROCEDURE — G8427 DOCREV CUR MEDS BY ELIG CLIN: HCPCS | Performed by: FAMILY MEDICINE

## 2020-01-27 RX ORDER — FAMOTIDINE 20 MG/1
TABLET, FILM COATED ORAL
Qty: 60 TABLET | Refills: 3 | Status: SHIPPED | OUTPATIENT
Start: 2020-01-27 | End: 2020-05-19 | Stop reason: SDUPTHER

## 2020-01-27 RX ORDER — FLUTICASONE PROPIONATE 50 MCG
1 SPRAY, SUSPENSION (ML) NASAL DAILY
Qty: 1 BOTTLE | Refills: 1 | Status: SHIPPED | OUTPATIENT
Start: 2020-01-27 | End: 2020-05-19 | Stop reason: SDUPTHER

## 2020-01-27 RX ORDER — AZITHROMYCIN 250 MG/1
250 TABLET, FILM COATED ORAL SEE ADMIN INSTRUCTIONS
Qty: 6 TABLET | Refills: 0 | Status: SHIPPED | OUTPATIENT
Start: 2020-01-27 | End: 2020-02-01

## 2020-01-27 RX ORDER — ALBUTEROL SULFATE 90 UG/1
2 AEROSOL, METERED RESPIRATORY (INHALATION) EVERY 6 HOURS PRN
Qty: 1 INHALER | Refills: 0 | Status: SHIPPED | OUTPATIENT
Start: 2020-01-27 | End: 2021-01-27

## 2020-01-27 ASSESSMENT — PATIENT HEALTH QUESTIONNAIRE - PHQ9
SUM OF ALL RESPONSES TO PHQ QUESTIONS 1-9: 2
2. FEELING DOWN, DEPRESSED OR HOPELESS: 1
1. LITTLE INTEREST OR PLEASURE IN DOING THINGS: 1
SUM OF ALL RESPONSES TO PHQ9 QUESTIONS 1 & 2: 2
SUM OF ALL RESPONSES TO PHQ QUESTIONS 1-9: 2

## 2020-02-01 ASSESSMENT — ENCOUNTER SYMPTOMS
SHORTNESS OF BREATH: 0
RHINORRHEA: 1
ABDOMINAL PAIN: 0
BACK PAIN: 0
NAUSEA: 0
SORE THROAT: 0
COUGH: 0
CHEST TIGHTNESS: 0
SINUS PRESSURE: 1

## 2020-02-25 ENCOUNTER — OFFICE VISIT (OUTPATIENT)
Dept: INTERNAL MEDICINE CLINIC | Age: 40
End: 2020-02-25
Payer: MEDICAID

## 2020-02-25 VITALS
DIASTOLIC BLOOD PRESSURE: 80 MMHG | OXYGEN SATURATION: 98 % | WEIGHT: 115.6 LBS | HEIGHT: 66 IN | BODY MASS INDEX: 18.58 KG/M2 | HEART RATE: 100 BPM | SYSTOLIC BLOOD PRESSURE: 118 MMHG

## 2020-02-25 LAB — STREPTOCOCCUS A RNA: NEGATIVE

## 2020-02-25 PROCEDURE — G8420 CALC BMI NORM PARAMETERS: HCPCS | Performed by: FAMILY MEDICINE

## 2020-02-25 PROCEDURE — 87651 STREP A DNA AMP PROBE: CPT | Performed by: FAMILY MEDICINE

## 2020-02-25 PROCEDURE — 4004F PT TOBACCO SCREEN RCVD TLK: CPT | Performed by: FAMILY MEDICINE

## 2020-02-25 PROCEDURE — 99213 OFFICE O/P EST LOW 20 MIN: CPT | Performed by: FAMILY MEDICINE

## 2020-02-25 PROCEDURE — G8484 FLU IMMUNIZE NO ADMIN: HCPCS | Performed by: FAMILY MEDICINE

## 2020-02-25 PROCEDURE — G8427 DOCREV CUR MEDS BY ELIG CLIN: HCPCS | Performed by: FAMILY MEDICINE

## 2020-02-25 RX ORDER — FLUCONAZOLE 150 MG/1
150 TABLET ORAL
Qty: 2 TABLET | Refills: 0 | Status: SHIPPED | OUTPATIENT
Start: 2020-02-25 | End: 2020-03-02

## 2020-02-25 RX ORDER — AMOXICILLIN 500 MG/1
500 CAPSULE ORAL 3 TIMES DAILY
Qty: 30 CAPSULE | Refills: 0 | Status: SHIPPED | OUTPATIENT
Start: 2020-02-25 | End: 2020-03-06

## 2020-02-25 ASSESSMENT — ENCOUNTER SYMPTOMS
SINUS PRESSURE: 1
NAUSEA: 0
CONSTIPATION: 0
CHEST TIGHTNESS: 0
COUGH: 0
RHINORRHEA: 1
DIARRHEA: 0
BACK PAIN: 0
SORE THROAT: 1
SHORTNESS OF BREATH: 0
BLOOD IN STOOL: 0
ABDOMINAL PAIN: 0

## 2020-02-25 ASSESSMENT — PATIENT HEALTH QUESTIONNAIRE - PHQ9
SUM OF ALL RESPONSES TO PHQ9 QUESTIONS 1 & 2: 0
1. LITTLE INTEREST OR PLEASURE IN DOING THINGS: 0
SUM OF ALL RESPONSES TO PHQ QUESTIONS 1-9: 0
2. FEELING DOWN, DEPRESSED OR HOPELESS: 0
SUM OF ALL RESPONSES TO PHQ QUESTIONS 1-9: 0

## 2020-02-25 NOTE — PROGRESS NOTES
Jose Maddox  1980  44 y.o.  female    Chief Complaint   Patient presents with    Pharyngitis    Generalized Body Aches    Other     possible yeast infection    Sinus Problem         History of Present Illness  Jose Maddox is a 44 y.o. female who presents today for c/o of feeling feverish, achy with a sore throat for few days. She was seen by [facility] urgent care and told she had the flu last week. She states she has sinus pressure, PND, congestion and drainage for the past week. +Sick contacts. She also c/o of a yeast infection. No pelvic pain, Cp or Sob. Review of Systems   Constitutional: Negative for chills, diaphoresis and fever. HENT: Positive for congestion, postnasal drip, rhinorrhea, sinus pressure and sore throat. Negative for ear pain. Respiratory: Negative for cough, chest tightness and shortness of breath. Cardiovascular: Negative for chest pain and palpitations. Gastrointestinal: Negative for abdominal pain, blood in stool, constipation, diarrhea and nausea. Genitourinary: Positive for vaginal discharge (itchy/white). Negative for difficulty urinating. Musculoskeletal: Positive for myalgias. Negative for back pain. Neurological: Negative for dizziness, light-headedness and headaches.        Allergies   Allergen Reactions    Sulfa Antibiotics Itching       Past Medical History:   Diagnosis Date    Anxiety     Bone spur 04/2017    left heel of foot    Depression     Diverticulosis     IBS (irritable bowel syndrome)     Menorrhagia     Migraine     Plantar fasciitis        Past Surgical History:   Procedure Laterality Date    DILATION AND CURETTAGE OF UTERUS  02/2017       Family History   Problem Relation Age of Onset    No Known Problems Mother     No Known Problems Father     No Known Problems Sister     No Known Problems Brother        Social History     Tobacco Use    Smoking status: Current Every Day Smoker     Packs/day: 1.00     Types: Cigarettes

## 2020-02-26 ENCOUNTER — OFFICE VISIT (OUTPATIENT)
Dept: PSYCHOLOGY | Age: 40
End: 2020-02-26
Payer: MEDICAID

## 2020-02-26 PROCEDURE — 4004F PT TOBACCO SCREEN RCVD TLK: CPT | Performed by: PSYCHOLOGIST

## 2020-02-26 PROCEDURE — 90791 PSYCH DIAGNOSTIC EVALUATION: CPT | Performed by: PSYCHOLOGIST

## 2020-02-26 ASSESSMENT — PATIENT HEALTH QUESTIONNAIRE - PHQ9
8. MOVING OR SPEAKING SO SLOWLY THAT OTHER PEOPLE COULD HAVE NOTICED. OR THE OPPOSITE, BEING SO FIGETY OR RESTLESS THAT YOU HAVE BEEN MOVING AROUND A LOT MORE THAN USUAL: 1
3. TROUBLE FALLING OR STAYING ASLEEP: 3
4. FEELING TIRED OR HAVING LITTLE ENERGY: 1
7. TROUBLE CONCENTRATING ON THINGS, SUCH AS READING THE NEWSPAPER OR WATCHING TELEVISION: 2
2. FEELING DOWN, DEPRESSED OR HOPELESS: 1
SUM OF ALL RESPONSES TO PHQ QUESTIONS 1-9: 13
1. LITTLE INTEREST OR PLEASURE IN DOING THINGS: 1
10. IF YOU CHECKED OFF ANY PROBLEMS, HOW DIFFICULT HAVE THESE PROBLEMS MADE IT FOR YOU TO DO YOUR WORK, TAKE CARE OF THINGS AT HOME, OR GET ALONG WITH OTHER PEOPLE: 1
6. FEELING BAD ABOUT YOURSELF - OR THAT YOU ARE A FAILURE OR HAVE LET YOURSELF OR YOUR FAMILY DOWN: 1
5. POOR APPETITE OR OVEREATING: 3
SUM OF ALL RESPONSES TO PHQ9 QUESTIONS 1 & 2: 2
SUM OF ALL RESPONSES TO PHQ QUESTIONS 1-9: 13

## 2020-02-26 NOTE — PROGRESS NOTES
Behavioral Health Consultation  Felipe Haddad Psy.D. Psychologist    Time spent with Patient: 25 minutes  Visit number: 1  Reason for Consult:  depression and anxiety  Referring Provider: Linda Wells DO  [address], [address]    Informed consent:  Pt provided informed consent for the behavioral health program. Discussed with patient model of service to include the limits of confidentiality (i.e. abuse reporting, suicide intervention, etc.) and focused intervention approach. Pt indicated understanding. S:  ----------------------------------------------------------------------------------------------------------------------    Presenting problem:  depression and anxiety  \"I suffer a lot of anxiety and have problems going to sleep because of it. \" Stated she was seeing a psychiatrist in [facility] as recently as 6 mos ago, but quit d/t distance and having to pay cash. Over the past 6 mos has has 4-5 panic attacks. Stated experiences \"panicky feelings regularly. \" During these episodes she experiences elevated HR, SOB, chest pressure, sense of doom. Additionally endorsed depressed mood, anhedonia, anergia, amotivation, poor sleep, fluctuating appetite, diminished concentration, and corrosive self-image. Today is anxious abt upcoming trip to [place] for her birthday. Boyfriend paid for trip and pt is \"freaking out\" about the flight. Stated psychiatry was prescribing #20 xanax . 5mg and it would last 3 mos. PCP, Dr. Tamy Queen, started her on effexor 37.5mg however she stopped taking it bc \"could feel it coming in waves. \" Additionally, she quit taking atarax because \"it made me a zombie. \"     Social:   Boyfriend of two years. They live together. Each have three children of their own. Her middle daughter has DM1 and is on disability. Oldest daughter is in college--\"she was mad at me for a while because I left her dad. \" 11yo son--he lives w pt half-time and \"fights about everything. \" Ex- and

## 2020-02-26 NOTE — Clinical Note
Will begin CBT. Recommend PCP consider low dosage benzodiazepine to aid pt in upcoming flight to [facility]. Pt advised to schedule appt with PCP to discuss.

## 2020-03-02 ENCOUNTER — OFFICE VISIT (OUTPATIENT)
Dept: INTERNAL MEDICINE CLINIC | Age: 40
End: 2020-03-02
Payer: MEDICAID

## 2020-03-02 VITALS
DIASTOLIC BLOOD PRESSURE: 78 MMHG | WEIGHT: 115.6 LBS | HEIGHT: 66 IN | SYSTOLIC BLOOD PRESSURE: 116 MMHG | HEART RATE: 65 BPM | OXYGEN SATURATION: 95 % | BODY MASS INDEX: 18.58 KG/M2

## 2020-03-02 PROCEDURE — G8484 FLU IMMUNIZE NO ADMIN: HCPCS | Performed by: FAMILY MEDICINE

## 2020-03-02 PROCEDURE — G8427 DOCREV CUR MEDS BY ELIG CLIN: HCPCS | Performed by: FAMILY MEDICINE

## 2020-03-02 PROCEDURE — 4004F PT TOBACCO SCREEN RCVD TLK: CPT | Performed by: FAMILY MEDICINE

## 2020-03-02 PROCEDURE — 99213 OFFICE O/P EST LOW 20 MIN: CPT | Performed by: FAMILY MEDICINE

## 2020-03-02 PROCEDURE — G8420 CALC BMI NORM PARAMETERS: HCPCS | Performed by: FAMILY MEDICINE

## 2020-03-02 RX ORDER — LORAZEPAM 0.5 MG/1
0.5 TABLET ORAL DAILY PRN
Qty: 5 TABLET | Refills: 0 | Status: SHIPPED | OUTPATIENT
Start: 2020-03-02 | End: 2020-09-08 | Stop reason: SDUPTHER

## 2020-03-02 ASSESSMENT — ENCOUNTER SYMPTOMS
DIARRHEA: 0
BACK PAIN: 0
ABDOMINAL PAIN: 0
CHEST TIGHTNESS: 0
NAUSEA: 0
CONSTIPATION: 1
COUGH: 0
SHORTNESS OF BREATH: 0

## 2020-03-02 ASSESSMENT — PATIENT HEALTH QUESTIONNAIRE - PHQ9
SUM OF ALL RESPONSES TO PHQ QUESTIONS 1-9: 2
2. FEELING DOWN, DEPRESSED OR HOPELESS: 1
SUM OF ALL RESPONSES TO PHQ QUESTIONS 1-9: 2
SUM OF ALL RESPONSES TO PHQ9 QUESTIONS 1 & 2: 2
1. LITTLE INTEREST OR PLEASURE IN DOING THINGS: 1

## 2020-03-02 NOTE — PROGRESS NOTES
Brijesh Tinsley  1980  44 y.o.  female    Chief Complaint   Patient presents with    Anxiety         History of Present Illness  Brijesh Tinsley is a 44 y.o. female who presents today for anxiety and depression. Pt state she can feel panicky in certain situations. She was seen by her psychologist that recommended she come in for benzodiazepines for her trip. She is flying to [facility] and has never been on a plane before. She states she only seen a psychiatrist one time in the past. She was on Xanax at that time. She feels depression symptoms are better. - She c/o of one episode of painless rectal bleeding when she went to the restroom. Symptoms a few days ago. She can have constipation off and on. No nausea, vomiting or diarrhea. No abdomen or rectal pain. Review of Systems   Constitutional: Negative for diaphoresis and fever. Respiratory: Negative for cough, chest tightness and shortness of breath. Cardiovascular: Negative for chest pain and palpitations. Gastrointestinal: Positive for constipation (off and on). Negative for abdominal pain, diarrhea and nausea. Episode of rectal bleeding   Genitourinary: Negative for difficulty urinating. Musculoskeletal: Negative for back pain. Neurological: Negative for dizziness and headaches.    Psychiatric/Behavioral:        Anxiety       Allergies   Allergen Reactions    Sulfa Antibiotics Itching       Past Medical History:   Diagnosis Date    Anxiety     Bone spur 04/2017    left heel of foot    Depression     Diverticulosis     IBS (irritable bowel syndrome)     Menorrhagia     Migraine     Plantar fasciitis        Past Surgical History:   Procedure Laterality Date    DILATION AND CURETTAGE OF UTERUS  02/2017       Family History   Problem Relation Age of Onset    No Known Problems Mother     No Known Problems Father     No Known Problems Sister     No Known Problems Brother        Social History     Tobacco Use    Smoking status: place, and time. Cranial Nerves: No cranial nerve deficit. Psychiatric:         Mood and Affect: Mood normal.         ASSESSMENT:  1. Situational anxiety    2. Depression, unspecified depression type    3. Painless rectal bleeding        PLAN:  Orders Placed This Encounter   Procedures   Marianna Nicholas MD, Gastroenterology, [place]       Orders Placed This Encounter   Medications    LORazepam (ATIVAN) 0.5 MG tablet     Sig: Take 1 tablet by mouth daily as needed for Anxiety for up to 5 days. Dispense:  5 tablet     Refill:  0   Pt informed to call Sean Donavan for a psychiatrist and we can refer or she can go to mental health  I recommend SSRI or SNRI for long term treatment  Temporary Ativan RX to use for her flight  ADR's explained  Increase fiber/fluids  Refer to GI. If patient does not get a call she should call the office    Periodic Controlled Substance Monitoring: Possible medication side effects, risk of tolerance/dependence & alternative treatments discussed. Arun Stallworth DO)    Return if symptoms worsen or fail to improve.     Electronically Signed by Arun Stallworth DO

## 2020-05-19 RX ORDER — FLUTICASONE PROPIONATE 50 MCG
1 SPRAY, SUSPENSION (ML) NASAL DAILY
Qty: 1 BOTTLE | Refills: 2 | Status: SHIPPED | OUTPATIENT
Start: 2020-05-19 | End: 2020-12-29 | Stop reason: SDUPTHER

## 2020-05-19 RX ORDER — FAMOTIDINE 20 MG/1
TABLET, FILM COATED ORAL
Qty: 60 TABLET | Refills: 2 | Status: SHIPPED | OUTPATIENT
Start: 2020-05-19 | End: 2020-09-08

## 2020-05-19 NOTE — TELEPHONE ENCOUNTER
Pt has appt in August as she has not seen the Gastroenterologists yet. Needs medications refilled. Please advise.

## 2020-08-02 ASSESSMENT — PAIN SCALES - GENERAL: PAINLEVEL_OUTOF10: 9

## 2020-08-03 ENCOUNTER — HOSPITAL ENCOUNTER (EMERGENCY)
Age: 40
Discharge: HOME | End: 2020-08-03
Attending: EMERGENCY MEDICINE
Payer: MEDICAID

## 2020-08-03 ENCOUNTER — APPOINTMENT (OUTPATIENT)
Dept: GENERAL RADIOLOGY | Age: 40
End: 2020-08-03
Payer: MEDICAID

## 2020-08-03 VITALS
TEMPERATURE: 98.4 F | DIASTOLIC BLOOD PRESSURE: 66 MMHG | OXYGEN SATURATION: 94 % | HEART RATE: 75 BPM | SYSTOLIC BLOOD PRESSURE: 109 MMHG | WEIGHT: 120 LBS | BODY MASS INDEX: 19.29 KG/M2 | RESPIRATION RATE: 20 BRPM | HEIGHT: 66 IN

## 2020-08-03 LAB
ALBUMIN SERPL-MCNC: 3.9 GM/DL (ref 3.4–5)
ALP BLD-CCNC: 74 IU/L (ref 40–128)
ALT SERPL-CCNC: 14 U/L (ref 10–40)
ANION GAP SERPL CALCULATED.3IONS-SCNC: 12 MMOL/L (ref 4–16)
AST SERPL-CCNC: 15 IU/L (ref 15–37)
BASOPHILS ABSOLUTE: 0.1 K/CU MM
BASOPHILS RELATIVE PERCENT: 0.8 % (ref 0–1)
BILIRUB SERPL-MCNC: 0.2 MG/DL (ref 0–1)
BUN BLDV-MCNC: 10 MG/DL (ref 6–23)
CALCIUM SERPL-MCNC: 8.6 MG/DL (ref 8.3–10.6)
CHLORIDE BLD-SCNC: 101 MMOL/L (ref 99–110)
CO2: 26 MMOL/L (ref 21–32)
CREAT SERPL-MCNC: 0.6 MG/DL (ref 0.6–1.1)
DIFFERENTIAL TYPE: ABNORMAL
EOSINOPHILS ABSOLUTE: 0.3 K/CU MM
EOSINOPHILS RELATIVE PERCENT: 3.2 % (ref 0–3)
GFR AFRICAN AMERICAN: >60 ML/MIN/1.73M2
GFR NON-AFRICAN AMERICAN: >60 ML/MIN/1.73M2
GLUCOSE BLD-MCNC: 104 MG/DL (ref 70–99)
HCT VFR BLD CALC: 41.7 % (ref 37–47)
HEMOGLOBIN: 13.7 GM/DL (ref 12.5–16)
IMMATURE NEUTROPHIL %: 0.2 % (ref 0–0.43)
LYMPHOCYTES ABSOLUTE: 4.4 K/CU MM
LYMPHOCYTES RELATIVE PERCENT: 51.8 % (ref 24–44)
MCH RBC QN AUTO: 31.9 PG (ref 27–31)
MCHC RBC AUTO-ENTMCNC: 32.9 % (ref 32–36)
MCV RBC AUTO: 97.2 FL (ref 78–100)
MONOCYTES ABSOLUTE: 0.6 K/CU MM
MONOCYTES RELATIVE PERCENT: 7.1 % (ref 0–4)
NUCLEATED RBC %: 0 %
PDW BLD-RTO: 13.2 % (ref 11.7–14.9)
PLATELET # BLD: 229 K/CU MM (ref 140–440)
PMV BLD AUTO: 10.7 FL (ref 7.5–11.1)
POTASSIUM SERPL-SCNC: 3.6 MMOL/L (ref 3.5–5.1)
PRO-BNP: 32.45 PG/ML
RBC # BLD: 4.29 M/CU MM (ref 4.2–5.4)
SEGMENTED NEUTROPHILS ABSOLUTE COUNT: 3.1 K/CU MM
SEGMENTED NEUTROPHILS RELATIVE PERCENT: 36.9 % (ref 36–66)
SODIUM BLD-SCNC: 139 MMOL/L (ref 135–145)
TOTAL IMMATURE NEUTOROPHIL: 0.02 K/CU MM
TOTAL NUCLEATED RBC: 0 K/CU MM
TOTAL PROTEIN: 6.3 GM/DL (ref 6.4–8.2)
TROPONIN T: <0.01 NG/ML
WBC # BLD: 8.5 K/CU MM (ref 4–10.5)

## 2020-08-03 PROCEDURE — 85025 COMPLETE CBC W/AUTO DIFF WBC: CPT

## 2020-08-03 PROCEDURE — U0002 COVID-19 LAB TEST NON-CDC: HCPCS

## 2020-08-03 PROCEDURE — 84484 ASSAY OF TROPONIN QUANT: CPT

## 2020-08-03 PROCEDURE — 99285 EMERGENCY DEPT VISIT HI MDM: CPT

## 2020-08-03 PROCEDURE — 93010 ELECTROCARDIOGRAM REPORT: CPT | Performed by: INTERNAL MEDICINE

## 2020-08-03 PROCEDURE — 80053 COMPREHEN METABOLIC PANEL: CPT

## 2020-08-03 PROCEDURE — 71045 X-RAY EXAM CHEST 1 VIEW: CPT

## 2020-08-03 PROCEDURE — 94640 AIRWAY INHALATION TREATMENT: CPT

## 2020-08-03 PROCEDURE — 83880 ASSAY OF NATRIURETIC PEPTIDE: CPT

## 2020-08-03 PROCEDURE — 6370000000 HC RX 637 (ALT 250 FOR IP): Performed by: EMERGENCY MEDICINE

## 2020-08-03 PROCEDURE — 93005 ELECTROCARDIOGRAM TRACING: CPT | Performed by: EMERGENCY MEDICINE

## 2020-08-03 RX ORDER — PREDNISONE 10 MG/1
40 TABLET ORAL DAILY
Qty: 16 TABLET | Refills: 0 | Status: SHIPPED | OUTPATIENT
Start: 2020-08-04 | End: 2020-08-08

## 2020-08-03 RX ORDER — ALBUTEROL SULFATE 90 UG/1
2 AEROSOL, METERED RESPIRATORY (INHALATION) ONCE
Status: COMPLETED | OUTPATIENT
Start: 2020-08-03 | End: 2020-08-03

## 2020-08-03 RX ORDER — PREDNISONE 20 MG/1
40 TABLET ORAL ONCE
Status: COMPLETED | OUTPATIENT
Start: 2020-08-03 | End: 2020-08-03

## 2020-08-03 RX ORDER — BENZONATATE 100 MG/1
100 CAPSULE ORAL 3 TIMES DAILY PRN
Qty: 10 CAPSULE | Refills: 0 | Status: SHIPPED | OUTPATIENT
Start: 2020-08-03 | End: 2020-08-10

## 2020-08-03 RX ORDER — GUAIFENESIN/DEXTROMETHORPHAN 100-10MG/5
5 SYRUP ORAL 4 TIMES DAILY PRN
Qty: 120 ML | Refills: 0 | Status: SHIPPED | OUTPATIENT
Start: 2020-08-03 | End: 2020-08-13

## 2020-08-03 RX ORDER — ALBUTEROL SULFATE 90 UG/1
2 AEROSOL, METERED RESPIRATORY (INHALATION) EVERY 4 HOURS PRN
Qty: 1 INHALER | Refills: 1 | Status: SHIPPED | OUTPATIENT
Start: 2020-08-03 | End: 2020-12-29 | Stop reason: SDUPTHER

## 2020-08-03 RX ADMIN — PREDNISONE 40 MG: 20 TABLET ORAL at 03:31

## 2020-08-03 RX ADMIN — ALBUTEROL SULFATE 2 PUFF: 90 AEROSOL, METERED RESPIRATORY (INHALATION) at 02:41

## 2020-08-03 ASSESSMENT — PAIN SCALES - GENERAL: PAINLEVEL_OUTOF10: 5

## 2020-08-03 NOTE — ED NOTES
Repeat covid swab done , pt up walked to restroom , skin w/d pink a/ox4 no distress     Shyanne Felipe RN  08/03/20 6521

## 2020-08-03 NOTE — ED TRIAGE NOTES
Pt presents to ED c/o cough and SOB for the past week. Pt states it started with the cough and is having worsening SOB. Pt is alert and oriented.

## 2020-08-03 NOTE — ED PROVIDER NOTES
Emergency Department Encounter    Patient: Harish Quintana  MRN: [id number]  : 1980  Date of Evaluation: 8/3/2020  ED Provider:  Kamla Perdomo    Triage Chief Complaint:   Cough and Shortness of Breath    Cheesh-Na:  Harish Quintana is a 36 y.o. female that presents with complaint of cough and shortness of breath, as well as congestion. Is been ongoing for a week. She is a , has come into contact with a lot of different people. Denies fevers. No vomiting or diarrhea. Has been using an albuterol inhaler from a previous illness, has helped some. She has no underlying lung conditions. No chest pain. No leg swelling or pain. Her boyfriend has the same symptoms and is also being seen. She had also taken some leftover Tessalon Perles that she had at home and that did help with the coughing but she only had 5 of them.     ROS - see HPI, below listed is current ROS at time of my eval:  10 systems reviewed and negative except as above    Past Medical History:   Diagnosis Date    Anxiety     Bone spur 2017    left heel of foot    Depression     Diverticulosis     IBS (irritable bowel syndrome)     Menorrhagia     Migraine     Plantar fasciitis      Past Surgical History:   Procedure Laterality Date    DILATION AND CURETTAGE OF UTERUS  2017     Family History   Problem Relation Age of Onset    No Known Problems Mother     No Known Problems Father     No Known Problems Sister     No Known Problems Brother      Social History     Socioeconomic History    Marital status: Single     Spouse name: Not on file    Number of children: 3    Years of education: Not on file    Highest education level: Not on file   Occupational History    Occupation:    Social Needs    Financial resource strain: Not on file    Food insecurity     Worry: Not on file     Inability: Not on file   [employer] needs     Medical: Not on file     Non-medical: Not on file   Tobacco Use    Smoking status: Current Every Day Smoker     Packs/day: 1.00     Types: Cigarettes     Start date: 0    Smokeless tobacco: Never Used   Substance and Sexual Activity    Alcohol use: Yes     Comment: soc    Drug use: No    Sexual activity: Yes     Partners: Male   Lifestyle    Physical activity     Days per week: Not on file     Minutes per session: Not on file    Stress: Not on file   Relationships    Social connections     Talks on phone: Not on file     Gets together: Not on file     Attends Jain service: Not on file     Active member of club or organization: Not on file     Attends meetings of clubs or organizations: Not on file     Relationship status: Not on file    Intimate partner violence     Fear of current or ex partner: Not on file     Emotionally abused: Not on file     Physically abused: Not on file     Forced sexual activity: Not on file   Other Topics Concern    Not on file   Social History Narrative    Not on file     Current Facility-Administered Medications   Medication Dose Route Frequency Provider Last Rate Last Dose    predniSONE (DELTASONE) tablet 40 mg  40 mg Oral Once Jonny Wayne MD         Current Outpatient Medications   Medication Sig Dispense Refill    benzonatate (TESSALON PERLES) 100 MG capsule Take 1 capsule by mouth 3 times daily as needed for Cough 10 capsule 0    guaiFENesin-dextromethorphan (ROBITUSSIN DM) 100-10 MG/5ML syrup Take 5 mLs by mouth 4 times daily as needed for Cough 120 mL 0    albuterol sulfate HFA (PROVENTIL HFA) 108 (90 Base) MCG/ACT inhaler Inhale 2 puffs into the lungs every 4 hours as needed for Wheezing or Shortness of Breath With spacer (and mask if indicated). Thanks.  1 Inhaler 1    Spacer/Aero-Holding Chambers (E-Z SPACER) JUAQUIN 1 Device by Does not apply route daily as needed (for use with inhaler for wheezing) 1 Device 0    [START ON 8/4/2020] predniSONE (DELTASONE) 10 MG tablet Take 4 tablets by mouth daily for 4 days 16 tablet 0    famotidine (PEPCID) 20 MG tablet TAKE ONE TABLET BY MOUTH TWICE A DAY 60 tablet 2    fluticasone (FLONASE) 50 MCG/ACT nasal spray 1 spray by Nasal route daily 1 Bottle 2    albuterol sulfate  (90 Base) MCG/ACT inhaler Inhale 2 puffs into the lungs every 6 hours as needed for Wheezing 1 Inhaler 0    etodolac (LODINE XL) 600 MG extended release tablet Take 600 mg by mouth daily       Allergies   Allergen Reactions    Sulfa Antibiotics Itching       Nursing Notes Reviewed    Physical Exam:  Triage VS:    ED Triage Vitals   Enc Vitals Group      BP 08/03/20 0029 109/66      Pulse 08/03/20 0029 90      Resp 08/03/20 0029 16      Temp 08/03/20 0029 98.1 °F (36.7 °C)      Temp Source 08/03/20 0029 Oral      SpO2 08/03/20 0029 98 %      Weight 08/03/20 0000 120 lb (54.4 kg)      Height 08/03/20 0000 5' 5.5\" (1.664 m)      Head Circumference --       Peak Flow --       Pain Score --       Pain Loc --       Pain Edu? --       Excl. in [address]? --        My pulse ox interpretation is - normal    General appearance:  No acute distress. Skin:  Warm. Dry. Eye:  Extraocular movements intact. Ears, nose, mouth and throat:  Oral mucosa moist   Neck:  Trachea midline. Extremity:  No swelling. Normal ROM     Heart:  Regular rate and rhythm, normal S1 & S2, no extra heart sounds. Perfusion:  intact  Respiratory: Respirations nonlabored. Speaks in full sentences. Wheezing throughout bilateral lung fields, does clear some with coughing but not entirely. No intercostal muscle use. Abdominal: Soft. Nontender. Non distended.   Neurological:  Alert and oriented           Psychiatric:  Appropriate    I have reviewed and interpreted all of the currently available lab results from this visit (if applicable):  Results for orders placed or performed during the hospital encounter of 08/03/20   CBC Auto Differential   Result Value Ref Range    WBC 8.5 4.0 - 10.5 K/CU MM    RBC 4.29 4.2 - 5.4 M/CU MM    Hemoglobin 13.7 12.5 - 16.0 GM/DL    Hematocrit 41.7 37 - 47 %    MCV 97.2 78 - 100 FL    MCH 31.9 (H) 27 - 31 PG    MCHC 32.9 32.0 - 36.0 %    RDW 13.2 11.7 - 14.9 %    Platelets 584 790 - 785 K/CU MM    MPV 10.7 7.5 - 11.1 FL    Differential Type AUTOMATED DIFFERENTIAL     Segs Relative 36.9 36 - 66 %    Lymphocytes % 51.8 (H) 24 - 44 %    Monocytes % 7.1 (H) 0 - 4 %    Eosinophils % 3.2 (H) 0 - 3 %    Basophils % 0.8 0 - 1 %    Segs Absolute 3.1 K/CU MM    Lymphocytes Absolute 4.4 K/CU MM    Monocytes Absolute 0.6 K/CU MM    Eosinophils Absolute 0.3 K/CU MM    Basophils Absolute 0.1 K/CU MM    Nucleated RBC % 0.0 %    Total Nucleated RBC 0.0 K/CU MM    Total Immature Neutrophil 0.02 K/CU MM    Immature Neutrophil % 0.2 0 - 0.43 %   Comprehensive Metabolic Panel   Result Value Ref Range    Sodium 139 135 - 145 MMOL/L    Potassium 3.6 3.5 - 5.1 MMOL/L    Chloride 101 99 - 110 mMol/L    CO2 26 21 - 32 MMOL/L    BUN 10 6 - 23 MG/DL    CREATININE 0.6 0.6 - 1.1 MG/DL    Glucose 104 (H) 70 - 99 MG/DL    Calcium 8.6 8.3 - 10.6 MG/DL    Alb 3.9 3.4 - 5.0 GM/DL    Total Protein 6.3 (L) 6.4 - 8.2 GM/DL    Total Bilirubin 0.2 0.0 - 1.0 MG/DL    ALT 14 10 - 40 U/L    AST 15 15 - 37 IU/L    Alkaline Phosphatase 74 40 - 128 IU/L    GFR Non-African American >60 >60 mL/min/1.73m2    GFR African American >60 >60 mL/min/1.73m2    Anion Gap 12 4 - 16   Troponin   Result Value Ref Range    Troponin T <0.010 <0.01 NG/ML   Brain Natriuretic Peptide   Result Value Ref Range    Pro-BNP 32.45 <300 PG/ML   EKG 12 Lead   Result Value Ref Range    Ventricular Rate 88 BPM    Atrial Rate 88 BPM    P-R Interval 158 ms    QRS Duration 84 ms    Q-T Interval 406 ms    QTc Calculation (Bazett) 491 ms    P Axis 75 degrees    R Axis 75 degrees    T Axis 72 degrees    Diagnosis       Normal sinus rhythm  Prolonged QT  Abnormal ECG  When compared with ECG of 21-MAR-2019 08:26,  No significant change was found        Radiographs (if obtained):  Radiologist's Report Reviewed:  No results found. EKG (if obtained): (All EKG's are interpreted by myself in the absence of a cardiologist)  Normal sinus rhythm with rate of 88 bpm, mildly prolonged QT interval, otherwise normal intervals, no ST elevation. No previous to compare. MDM:  80-year-old female with history as above presents with concern for cough, shortness of breath, congestion, all occurring over the last week. Boyfriend has same symptoms and is also being seen. Her vitals are normal.  Labs and chest x-ray were ordered. Given the current pandemic I will test for COVID-19. I suspect likely viral illness as both of them have the same symptoms. Chest x-ray shows mild peribronchial cuffing due to reactive airways or bronchitis. This would be consistent with her exam.  I do not believe she has a bacterial infection, she denies fevers at home, has a normal white count, no tachycardia. No consolidations on x-ray. Troponin and BNP are normal.  Her electrolytes are normal.  She is in no respiratory distress. Plan for albuterol and prednisone here. Plan for albuterol, spacer, Tessalon Perles, prednisone and guaifenesin for home. Given strict return precautions and advised on isolation, both she and boyfriend expressed understanding. She will be discharged in stable condition, COVID-19 testing pending    I did don/doff appropriate PPE (including face mask, safety glasses), as recommended by the health facility/national standard best practice, during my bedside interactions with the patient. Clinical Impression:  1. Suspected COVID-19 virus infection    2. Cough    3.  Shortness of breath      Disposition referral (if applicable):  [phone]  [address] Emergency Department  [place] [place] [facility] [phone]    If symptoms worsen    Disposition medications (if applicable):  New Prescriptions    ALBUTEROL SULFATE HFA (PROVENTIL HFA) 108 (90 BASE) MCG/ACT INHALER    Inhale 2 puffs into the lungs every 4 hours as needed for Wheezing or Shortness of Breath With spacer (and mask if indicated). Thanks. BENZONATATE (TESSALON PERLES) 100 MG CAPSULE    Take 1 capsule by mouth 3 times daily as needed for Cough    GUAIFENESIN-DEXTROMETHORPHAN (ROBITUSSIN DM) 100-10 MG/5ML SYRUP    Take 5 mLs by mouth 4 times daily as needed for Cough    PREDNISONE (DELTASONE) 10 MG TABLET    Take 4 tablets by mouth daily for 4 days    SPACER/AERO-HOLDING CHAMBERS (E-Z SPACER) JUAQUIN    1 Device by Does not apply route daily as needed (for use with inhaler for wheezing)     ED Provider Disposition Time  DISPOSITION Discharge - Pending Orders Complete 08/03/2020 02:40:48 AM      Comment: Please note this report has been produced using speech recognition software and may contain errors related to that system including errors in grammar, punctuation, and spelling, as well as words and phrases that may be inappropriate. Efforts were made to edit the dictations.           Kamla Perdomo MD  08/03/20 [id number]

## 2020-08-04 ENCOUNTER — CARE COORDINATION (OUTPATIENT)
Dept: CARE COORDINATION | Age: 40
End: 2020-08-04

## 2020-08-04 LAB
SARS-COV-2: NOT DETECTED
SOURCE: NORMAL

## 2020-08-04 NOTE — CARE COORDINATION
diagnosis     Patient/family/caregiver given information for GetWell Loop and agrees to enroll yes  Patient's preferred e-mail: [email or web address]   Patient's preferred phone number: [phone]  Based on Loop alert triggers, patient will be contacted by nurse care manager for worsening symptoms. Pt will be further monitored by COVID Loop Team based on severity of symptoms and risk factors. Spoke with pt continues to have a productive cough, muscle soreness due to cough. She is taking prescribed medications as directed. Reviewed symptomatic treatment, disinfection of household surfaces & isolation recommendations. Pt is enrolled in Loop. Krys Dickey RN  Ambulatory Care Manager  [phone] office/cell  [phone] fax  [email or web address]

## 2020-08-06 ENCOUNTER — TELEPHONE (OUTPATIENT)
Dept: INTERNAL MEDICINE CLINIC | Age: 40
End: 2020-08-06

## 2020-08-06 LAB
EKG ATRIAL RATE: 88 BPM
EKG DIAGNOSIS: NORMAL
EKG P AXIS: 75 DEGREES
EKG P-R INTERVAL: 158 MS
EKG Q-T INTERVAL: 406 MS
EKG QRS DURATION: 84 MS
EKG QTC CALCULATION (BAZETT): 491 MS
EKG R AXIS: 75 DEGREES
EKG T AXIS: 72 DEGREES
EKG VENTRICULAR RATE: 88 BPM

## 2020-08-06 NOTE — TELEPHONE ENCOUNTER
Pt went to er got tested for covid came back negative pt says she would like to know if she can call in some medication as she is still sick please call and advise

## 2020-08-07 ENCOUNTER — CARE COORDINATION (OUTPATIENT)
Dept: FAMILY MEDICINE CLINIC | Age: 40
End: 2020-08-07

## 2020-08-07 ENCOUNTER — OFFICE VISIT (OUTPATIENT)
Dept: PRIMARY CARE CLINIC | Age: 40
End: 2020-08-07
Payer: MEDICAID

## 2020-08-07 ENCOUNTER — HOSPITAL ENCOUNTER (OUTPATIENT)
Age: 40
Setting detail: SPECIMEN
Discharge: HOME | End: 2020-08-07
Payer: MEDICAID

## 2020-08-07 VITALS — HEART RATE: 85 BPM | TEMPERATURE: 97.1 F | OXYGEN SATURATION: 98 %

## 2020-08-07 PROCEDURE — 4004F PT TOBACCO SCREEN RCVD TLK: CPT | Performed by: FAMILY MEDICINE

## 2020-08-07 PROCEDURE — G8428 CUR MEDS NOT DOCUMENT: HCPCS | Performed by: FAMILY MEDICINE

## 2020-08-07 PROCEDURE — 99213 OFFICE O/P EST LOW 20 MIN: CPT | Performed by: FAMILY MEDICINE

## 2020-08-07 PROCEDURE — U0002 COVID-19 LAB TEST NON-CDC: HCPCS

## 2020-08-07 PROCEDURE — G8420 CALC BMI NORM PARAMETERS: HCPCS | Performed by: FAMILY MEDICINE

## 2020-08-07 RX ORDER — DOXYCYCLINE HYCLATE 100 MG
100 TABLET ORAL 2 TIMES DAILY
Qty: 14 TABLET | Refills: 0 | Status: SHIPPED | OUTPATIENT
Start: 2020-08-07 | End: 2020-08-14

## 2020-08-07 RX ORDER — BENZONATATE 100 MG/1
100-200 CAPSULE ORAL 3 TIMES DAILY PRN
Qty: 40 CAPSULE | Refills: 0 | Status: SHIPPED | OUTPATIENT
Start: 2020-08-07 | End: 2020-08-14

## 2020-08-07 NOTE — PROGRESS NOTES
8/7/20  Luis Alfredo Ibarra  1980    FLU/COVID-19 CLINIC EVALUATION    HPI SYMPTOMS:    Employer: [employer]    [] Fevers  [] Chills  [x] Cough  [] Coughing up blood  [x] Chest Congestion  [x] Nasal Congestion  [x] Feeling short of breath  [x] Sometimes  [x] Frequently  [] All the time  [x] Chest pain- FROM COUGH  [x] Headaches  [x]Tolerable  [] Severe  [x] Sore throat: WAKES WITH SORENESS  [] Muscle aches  [] Nausea  [] Vomiting  []Unable to keep fluids down  [] Diarrhea  []Severe    [x] OTHER SYMPTOMS: DIZZINESS WHEN COUGHING, LOSS OF TASTE AND SMELL      Symptom Duration:   [] 1  [] 2   [] 3   [] 4    [] 5   [] 6   [] 7   [] 8   [] 9   [] 10   [x] 11   [] 12   [] 13   [] 14   [] Longer than 14 days    Symptom course:   [] Worsening     [] Stable     [x] Improving    RISK FACTORS:    [] Pregnant or possibly pregnant  [] Age over 61  [] Diabetes  [] Heart disease  [] Asthma  [] COPD/Other chronic lung diseases  [] Active Cancer  [] On Chemotherapy  [] Taking oral steroids  [] History Lymphoma/Leukemia  [] Close contact with a lab confirmed COVID-19 patient within 14 days of symptom onset  [] History of travel from affected geographical areas within 14 days of symptom onset       VITALS:  There were no vitals filed for this visit. TESTS:    POCT FLU:  [] Positive     []Negative    ASSESSMENT:    [] Flu  [] Possible COVID-19  [] Strep    PLAN:    [] Discharge home with written instructions for:  [] Flu management  [] Possible COVID-19 infection with self-quarantine and management of symptoms  [] Follow-up with primary care physician or emergency department if worsens  [] Evaluation per physician/nurse practitioner in clinic  [] Sent to ER       An  electronic signature was used to authenticate this note.      --Clyde Ac MA on 8/7/2020 at 10:32 AM
until results are back  FOLLOW-UP:  No follow-ups on file.     In addition to other information, the printed after visit summary provided to the patient includes:  [] COVID-19 Self care instructions  [] COVID-19 General patient information

## 2020-08-07 NOTE — PATIENT INSTRUCTIONS
Your COVID 19 test can take 7-10 days for the results come back. We ask that you make a Mychart page and view your test results this way. You will need to Self quarantine until you know your results. Increase fluids rest  Saline nasal spray as directed  Warm salt gargles for throat discomfort  Monitor temperature twice a day  Tylenol for fevers and/or discomfort. If symptoms are worse -Go to the ER. Follow up with your primary doctor    To Whom it May Concern:    Ruiz Gonzales has been tested for COVID on 08/07/20. They may NOT return to work until their lab test results back and they been fever free for 3 days. If test is positive they must stay home for 2 weeks or until they test negative or as directed by the [facility] Department.

## 2020-08-07 NOTE — CARE COORDINATION
Yellow alert noted in Loop remote symptom monitoring program. Messaged patient to notify [facility] if symptoms have worsened since yesterday:    Thank you for checking in with us. Please let me/us know if your symptoms are worse than yesterday or if you wish to speak to a nurse.  We are available between (8-4) M-F,9-1)S-S  Debi [phone]

## 2020-08-09 LAB
SARS-COV-2: NOT DETECTED
SOURCE: NORMAL

## 2020-08-18 ENCOUNTER — CARE COORDINATION (OUTPATIENT)
Dept: CARE COORDINATION | Age: 40
End: 2020-08-18

## 2020-08-18 NOTE — CARE COORDINATION
Resolving COVID19 monitoring episode. No further outreach is planned, ACM signing off. Mirella Trimble RN  Ambulatory Care Manager  [phone] office/cell  [phone] fax  [email or web address]

## 2020-09-08 ENCOUNTER — OFFICE VISIT (OUTPATIENT)
Dept: FAMILY MEDICINE CLINIC | Age: 40
End: 2020-09-08
Payer: MEDICAID

## 2020-09-08 VITALS
HEART RATE: 78 BPM | TEMPERATURE: 97.3 F | HEIGHT: 66 IN | WEIGHT: 137 LBS | BODY MASS INDEX: 22.02 KG/M2 | DIASTOLIC BLOOD PRESSURE: 78 MMHG | SYSTOLIC BLOOD PRESSURE: 118 MMHG

## 2020-09-08 PROCEDURE — 4004F PT TOBACCO SCREEN RCVD TLK: CPT | Performed by: FAMILY MEDICINE

## 2020-09-08 PROCEDURE — G8420 CALC BMI NORM PARAMETERS: HCPCS | Performed by: FAMILY MEDICINE

## 2020-09-08 PROCEDURE — 99214 OFFICE O/P EST MOD 30 MIN: CPT | Performed by: FAMILY MEDICINE

## 2020-09-08 PROCEDURE — G8427 DOCREV CUR MEDS BY ELIG CLIN: HCPCS | Performed by: FAMILY MEDICINE

## 2020-09-08 RX ORDER — OMEPRAZOLE 40 MG/1
40 CAPSULE, DELAYED RELEASE ORAL
Qty: 90 CAPSULE | Refills: 0 | Status: SHIPPED | OUTPATIENT
Start: 2020-09-08 | End: 2020-09-22 | Stop reason: SDUPTHER

## 2020-09-08 RX ORDER — LORAZEPAM 0.5 MG/1
0.5 TABLET ORAL DAILY PRN
Qty: 15 TABLET | Refills: 0 | Status: SHIPPED | OUTPATIENT
Start: 2020-09-08 | End: 2020-12-08 | Stop reason: SDUPTHER

## 2020-09-08 ASSESSMENT — ENCOUNTER SYMPTOMS
HEARTBURN: 1
DIARRHEA: 0
VOMITING: 0
BLOOD IN STOOL: 1
ABDOMINAL PAIN: 1
COUGH: 0
NAUSEA: 0
SHORTNESS OF BREATH: 0

## 2020-09-08 NOTE — PROGRESS NOTES
Patient ID: Abigail Buck [facility] 1980    . Chief Complaint   Patient presents with    Gastroesophageal Reflux    Depression    Anxiety         Gastroesophageal Reflux   She complains of abdominal pain and heartburn. She reports no coughing (no unusual) or no nausea. This is a recurrent problem. The problem occurs frequently. The symptoms are aggravated by certain foods. She has tried a PPI and a histamine-2 antagonist (tried Pepcid but it didn't help much and then it was on back order. has been trying OTC Prevacid which helps) for the symptoms. History of Present Illness  Hosea Del Cid is a 36 y.o. female who presents today for anxiety  Pt state she can feel panicky in certain situations. She was seen by her psychologist.  Panic symptoms about once per week. A week or 2 ago she is feeling depressed. It was a really tough work week. She is not depressed today. She does not have 2 weeks of feeling down and hopeless. - She c/o of one episode of painless rectal bleeding when she went to the restroom. Symptoms a few days ago. She was supposed to have gone to GI doctor but with a COVID pandemic she did not go. Blood in urine: She was told by her last PCP that review of her chart showed that she had recurrent blood in the urine. She was not aware of it at that time. The last PCP had referred her to a urologist.  However she did not follow-up because of the [place] pandemic. Would like to get the referral redone. Review of Systems   Constitutional: Negative for chills, diaphoresis (no unusual) and fever. HENT:        No loss of taste or smell sensation   Respiratory: Negative for cough (no unusual) and shortness of breath (no unusual). Gastrointestinal: Positive for abdominal pain, blood in stool and heartburn. Negative for diarrhea, nausea and vomiting. Musculoskeletal: Negative for myalgias. Neurological: Negative for headaches.        Patient Active Problem List   Diagnosis    Component Date Value    WBC 08/03/2020 8.5     RBC 08/03/2020 4.29     Hemoglobin 08/03/2020 13.7     Hematocrit 08/03/2020 41.7     MCV 08/03/2020 97.2     MCH 08/03/2020 31.9*    MCHC 08/03/2020 32.9     RDW 08/03/2020 13.2     Platelets 95/67/7483 229     MPV 08/03/2020 10.7     Differential Type 08/03/2020 AUTOMATED DIFFERENTIAL     Segs Relative 08/03/2020 36.9     Lymphocytes % 08/03/2020 51.8*    Monocytes % 08/03/2020 7.1*    Eosinophils % 08/03/2020 3.2*    Basophils % 08/03/2020 0.8     Segs Absolute 08/03/2020 3.1     Lymphocytes Absolute 08/03/2020 4.4     Monocytes Absolute 08/03/2020 0.6     Eosinophils Absolute 08/03/2020 0.3     Basophils Absolute 08/03/2020 0.1     Nucleated RBC % 08/03/2020 0.0     Total Nucleated RBC 08/03/2020 0.0     Total Immature Neutrophil 08/03/2020 0.02     Immature Neutrophil % 08/03/2020 0.2     Sodium 08/03/2020 139     Potassium 08/03/2020 3.6     Chloride 08/03/2020 101     CO2 08/03/2020 26     BUN 08/03/2020 10     CREATININE 08/03/2020 0.6     Glucose 08/03/2020 104*    Calcium 08/03/2020 8.6     Alb 08/03/2020 3.9     Total Protein 08/03/2020 6.3*    Total Bilirubin 08/03/2020 0.2     ALT 08/03/2020 14     AST 08/03/2020 15     Alkaline Phosphatase 08/03/2020 74     GFR Non- 08/03/2020 >60     GFR  08/03/2020 >60     Anion Gap 08/03/2020 12     Troponin T 08/03/2020 <0.010     Ventricular Rate 08/03/2020 88     Atrial Rate 08/03/2020 88     P-R Interval 08/03/2020 158     QRS Duration 08/03/2020 84     Q-T Interval 08/03/2020 406     QTc Calculation (Bazett) 08/03/2020 491     P Axis 08/03/2020 75     R Axis 08/03/2020 75     T Axis 08/03/2020 72     Diagnosis 08/03/2020                      Value:Normal sinus rhythm  Prolonged QT  Abnormal ECG  When compared with ECG of 21-MAR-2019 08:26,  No significant change was found  Confirmed by [facility] Joyce GOMEZ ([id number]) on 8/3/2020 10:13:37 PM      Pro-BNP 08/03/2020 32.45     Source 08/03/2020 VIRAL SWAB     SARS-CoV-2 08/03/2020 NOT DETECTED            Assessment:       Diagnosis Orders   1. Situational anxiety  LORazepam (ATIVAN) 0.5 MG tablet   2. Gastroesophageal reflux disease without esophagitis  omeprazole (PRILOSEC) 40 MG delayed release capsule   3. Asymptomatic microscopic hematuria  External Referral To Urology   4. Painless rectal bleeding  OTILIA - Nuvia Ruvalcaba MD, Gastroenterology, [place]           Plan:      See orders    Ativan no more than once per week. F/u psychologist    Her insurance may be more likely to cover the prilosec than the Prevacid    Avoid spicy foods          Return in about 2 weeks (around 9/22/2020) for Doxy, Gerd.

## 2020-09-22 ENCOUNTER — VIRTUAL VISIT (OUTPATIENT)
Dept: FAMILY MEDICINE CLINIC | Age: 40
End: 2020-09-22
Payer: MEDICAID

## 2020-09-22 PROCEDURE — 99213 OFFICE O/P EST LOW 20 MIN: CPT | Performed by: FAMILY MEDICINE

## 2020-09-22 PROCEDURE — G8428 CUR MEDS NOT DOCUMENT: HCPCS | Performed by: FAMILY MEDICINE

## 2020-09-22 RX ORDER — OMEPRAZOLE 40 MG/1
40 CAPSULE, DELAYED RELEASE ORAL
Qty: 90 CAPSULE | Refills: 3 | Status: SHIPPED | OUTPATIENT
Start: 2020-09-22 | End: 2021-10-01

## 2020-09-22 NOTE — PATIENT INSTRUCTIONS
October Fifth is the DEADLINE for voter registration for the November Third GENERAL ELECTION. Early voting starts October sixth. Don't forget to vote!!!        [address] TO ALL APPOINTMENTS    The diagnoses and medications listed in this after visit summary may not be accurate at the time of check out. Please check MY CHART in 28-48 hours for possible corrections. Late cancellation policy: So that we can better accommodate people who are sick, please give our office 24 hour notice for an appointment cancellation. Thank you. Missed appointments: Your care is very important to us. It is important that you keep your scheduled appointments. Multiple missed appointments will lead to a dismissal from the office. Later arrival policy: If you are more than 10 minutes late for your appointment, you will be asked to reschedule. Please allow 5-7 business days for paperwork to be processed. It is important that you check your MY Chart messages, as they include appointment reminders, test results, and other important information. If you have forgotten your password, please call [phone].

## 2020-09-22 NOTE — PROGRESS NOTES
2020    TELEHEALTH EVALUATION -- Audio/Visual (During MLZBB-89 public health emergency)    HPI:    Slade Barba (:  1980) has requested an audio/video evaluation for the following concern(s):        Gastroesophageal Reflux   She complains of abdominal pain and heartburn. She reports no coughing (no unusual) or no nausea. This is a recurrent problem. The problem occurs frequently. The symptoms are aggravated by certain foods. She has been taking prescription Prilosec which definitely helps. She is also been avoiding tomatoes which has improved her symptoms. She would like to continue taking    Anxiety: Has not been able to get into her psychologist.  Has had some behavioral problems with her son. He is gotten suspended twice from school. Review of Systems   Constitutional: Negative for activity change. Psychiatric/Behavioral: The patient is nervous/anxious. Prior to Visit Medications    Medication Sig Taking? Authorizing Provider   omeprazole (PRILOSEC) 40 MG delayed release capsule Take 1 capsule by mouth every morning (before breakfast) Yes Ashok Escalera MD   LORazepam (ATIVAN) 0.5 MG tablet Take 1 tablet by mouth daily as needed for Anxiety for up to 90 days. Yes Ashok Escalera MD   albuterol sulfate HFA (PROVENTIL HFA) 108 (90 Base) MCG/ACT inhaler Inhale 2 puffs into the lungs every 4 hours as needed for Wheezing or Shortness of Breath With spacer (and mask if indicated). Thanks.  Yes Harleen Hernandez MD   Ibuprofen (IBU-200 PO) Take by mouth  Historical Provider, MD   Spacer/Aero-Holding Chambers (E-Z SPACER) JUAQUIN 1 Device by Does not apply route daily as needed (for use with inhaler for wheezing)  Patient not taking: Reported on 2020  Harleen Hernandez MD   fluticasone (FLONASE) 50 MCG/ACT nasal spray 1 spray by Nasal route daily  Skip Naseem,    albuterol sulfate  (90 Base) MCG/ACT inhaler Inhale 2 puffs into the lungs every 6 hours as needed for Wheezing  Patient not

## 2020-12-08 ENCOUNTER — VIRTUAL VISIT (OUTPATIENT)
Dept: FAMILY MEDICINE CLINIC | Age: 40
End: 2020-12-08
Payer: MEDICAID

## 2020-12-08 PROCEDURE — 99214 OFFICE O/P EST MOD 30 MIN: CPT | Performed by: FAMILY MEDICINE

## 2020-12-08 RX ORDER — LORAZEPAM 0.5 MG/1
0.5 TABLET ORAL DAILY PRN
Qty: 15 TABLET | Refills: 0 | Status: SHIPPED | OUTPATIENT
Start: 2020-12-08 | End: 2021-04-26 | Stop reason: SDUPTHER

## 2020-12-08 RX ORDER — FLUOXETINE HYDROCHLORIDE 20 MG/1
20 CAPSULE ORAL DAILY
Qty: 30 CAPSULE | Refills: 0 | Status: SHIPPED | OUTPATIENT
Start: 2020-12-08 | End: 2021-01-19 | Stop reason: SDUPTHER

## 2020-12-08 RX ORDER — ONDANSETRON 4 MG/1
4 TABLET, FILM COATED ORAL EVERY 8 HOURS PRN
Qty: 15 TABLET | Refills: 0 | Status: SHIPPED | OUTPATIENT
Start: 2020-12-08 | End: 2020-12-13

## 2020-12-08 ASSESSMENT — ENCOUNTER SYMPTOMS: NAUSEA: 1
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
## 2020-12-08 NOTE — PATIENT INSTRUCTIONS
PLEASE BRING YOUR MEDICATIONS TO ALL APPOINTMENTS    The diagnoses and medications listed in this after visit summary may not be accurate at the time of check out. Please check MY CHART in 28-48 hours for possible corrections. Late cancellation policy: So that we can better accommodate people who are sick, please give our office 24 hour notice for an appointment cancellation. Thank you. Missed appointments: Your care is very important to us. It is important that you keep your scheduled appointments. Multiple missed appointments will lead to a dismissal from the office. Later arrival policy: If you are more than 10 minutes late for your appointment, you will be asked to reschedule. Please allow 5-7 business days for paperwork to be processed. It is important that you check your MY Chart messages, as they include appointment reminders, test results, and other important information. If you have forgotten your password, please call [phone].          1. Take your blood pressure medications at night. This reduces your chance of cardiovascular event by half  2. Fever in kids: It's best to give both Tylenol and Ibuprofen at the same time rather than staggering them which is confusing  3. Pediatric obesity is decreased by less exposure to antibiotics, consuming whole milk instead of skim milk, watching public TV instead of regular TV, and experiencing fewer adverse childhood events  4. 1 egg per day is good for your heart  5. Alternate day fasting does promote weight loss. 6. Skipping breakfast increases your risk of obesity  7. Artificially sweetened drinks increase all cause mortality (strokes, BMI, cardiovascular)  8. Kale consumption can reduce onset of dementia  9. Walking at least 8000 steps per day and resistance exercise 2-3 x per week are good for your heart  10.  Covid 19:  Wearing gloves is not that helpful  Having low vitamin D levels increases risk of infection (take 2-4000

## 2020-12-29 ENCOUNTER — VIRTUAL VISIT (OUTPATIENT)
Dept: FAMILY MEDICINE CLINIC | Age: 40
End: 2020-12-29
Payer: MEDICAID

## 2020-12-29 PROCEDURE — 99213 OFFICE O/P EST LOW 20 MIN: CPT | Performed by: FAMILY MEDICINE

## 2020-12-29 RX ORDER — ALBUTEROL SULFATE 90 UG/1
2 AEROSOL, METERED RESPIRATORY (INHALATION) EVERY 4 HOURS PRN
Qty: 1 INHALER | Refills: 1 | Status: SHIPPED | OUTPATIENT
Start: 2020-12-29 | End: 2022-07-28

## 2020-12-29 RX ORDER — FLUTICASONE PROPIONATE 50 MCG
1 SPRAY, SUSPENSION (ML) NASAL DAILY
Qty: 1 BOTTLE | Refills: 2 | Status: SHIPPED | OUTPATIENT
Start: 2020-12-29 | End: 2022-02-21

## 2020-12-29 NOTE — PATIENT INSTRUCTIONS
PLEASE BRING YOUR MEDICATIONS TO ALL APPOINTMENTS    The diagnoses and medications listed in this after visit summary may not be accurate at the time of check out. Please check MY CHART in 28-48 hours for possible corrections. Late cancellation policy: So that we can better accommodate people who are sick, please give our office 24 hour notice for an appointment cancellation. Thank you. Missed appointments: Your care is very important to us. It is important that you keep your scheduled appointments. Multiple missed appointments will lead to a dismissal from the office. Later arrival policy: If you are more than 10 minutes late for your appointment, you will be asked to reschedule. Please allow 5-7 business days for paperwork to be processed. It is important that you check your MY Chart messages, as they include appointment reminders, test results, and other important information. If you have forgotten your password, please call [phone].          1. Take your blood pressure medications at night. This reduces your chance of cardiovascular event by half  2. Fever in kids: It's best to give both Tylenol and Ibuprofen at the same time rather than staggering them which is confusing  3. Pediatric obesity is decreased by less exposure to antibiotics, consuming whole milk instead of skim milk, watching public TV instead of regular TV, and experiencing fewer adverse childhood events  4. 1 egg per day is good for your heart  5. Alternate day fasting does promote weight loss. 6. Skipping breakfast increases your risk of obesity  7. Artificially sweetened drinks increase all cause mortality (strokes, BMI, cardiovascular)  8. Kale consumption can reduce onset of dementia  9. Walking at least 8000 steps per day and resistance exercise 2-3 x per week are good for your heart  10.  Covid 19:  Wearing gloves is not that helpful Having low vitamin D levels increases risk of infection (take 2-4000 units of D3 per day until our covid crisis is resolved)  11.  Brushing teeth 3 times per day can decrease chance of getting diabetes

## 2020-12-29 NOTE — PROGRESS NOTES
2020    TELEHEALTH EVALUATION -- Audio/Visual (During AUITG-15 public health emergency)    HPI:    Benjamin Beaver (:  1980) has requested an audio/video evaluation for the following concern(s):  Chief Complaint   Patient presents with    Headache    Anxiety     afraid to take xanax due to her insurance, received letter they are changing her insurance, she is unable to reach anyone regarding this.  Nausea       Headache and insomnia resolved. Thinks she was just having a lot of stress    Anxiety: She was afraid to take the Prozac. She is not sure what the status of her insurance will be soon and she was afraid of starting the medicine and not being able to get it refilled. She is afraid of going through withdrawal.  The anxiety is still there. She did have a good friend of hers recently passed away. It put things in perspective a bit. Nausea: Still present. She thinks is due to her stress. She believes the Prilosec is helping. Review of Systems    Prior to Visit Medications    Medication Sig Taking? Authorizing Provider   albuterol sulfate HFA (PROVENTIL HFA) 108 (90 Base) MCG/ACT inhaler Inhale 2 puffs into the lungs every 4 hours as needed for Wheezing or Shortness of Breath With spacer (and mask if indicated). Thanks. Yes Audrey Eckert MD   fluticasone [facility]) 50 MCG/ACT nasal spray 1 spray by Nasal route daily Yes Audrey Eckert MD   LORazepam (ATIVAN) 0.5 MG tablet Take 1 tablet by mouth daily as needed for Anxiety for up to 90 days.  Yes Audrey Eckert MD   omeprazole (PRILOSEC) 40 MG delayed release capsule Take 1 capsule by mouth every morning (before breakfast) Yes Audrey Eckert MD   Ibuprofen (IBU-200 PO) Take by mouth Yes Historical Provider, MD   FLUoxetine (PROZAC) 20 MG capsule Take 1 capsule by mouth daily  Patient not taking: Reported on 2020  Audrey Eckert MD Spacer/Aero-Holding Chambers (E-Z SPACER) JUAQUIN 1 Device by Does not apply route daily as needed (for use with inhaler for wheezing)  Patient not taking: Reported on 9/8/2020  Maria Alejandra Joaquin MD   albuterol sulfate  (90 Base) MCG/ACT inhaler Inhale 2 puffs into the lungs every 6 hours as needed for Wheezing  Patient not taking: Reported on 9/8/2020  Wisam Garcia DO       Social History     Tobacco Use    Smoking status: Current Every Day Smoker     Packs/day: 1.00     Types: Cigarettes     Start date: 1997    Smokeless tobacco: Never Used   Substance Use Topics    Alcohol use: Yes     Comment: soc    Drug use: No        Allergies   Allergen Reactions    Sulfa Antibiotics Itching   ,   Past Medical History:   Diagnosis Date    Anxiety     Bone spur 04/2017    left heel of foot    Depression     Diverticulosis     IBS (irritable bowel syndrome)     Menorrhagia     Migraine     Plantar fasciitis        PHYSICAL EXAMINATION:  [ INSTRUCTIONS:  \"[x]\" Indicates a positive item  \"[]\" Indicates a negative item  -- DELETE ALL ITEMS NOT EXAMINED]  Vital Signs: (As obtained by patient/caregiver or practitioner observation)    Blood pressure-  Heart rate-    Respiratory rate-    Temperature-  Pulse oximetry-     Constitutional: [x] Appears well-developed and well-nourished [x] No apparent distress      [] Abnormal-   Mental status  [x] Alert and awake  [x] Oriented to person/place/time [x]Able to follow commands      Eyes:  EOM    []  Normal  [] Abnormal-  Sclera  []  Normal  [] Abnormal -         Discharge []  None visible  [] Abnormal -    HENT:   [x] Normocephalic, atraumatic.   [] Abnormal   [] Mouth/Throat: Mucous membranes are moist.     External Ears [] Normal  [] Abnormal-     Neck: [x] No visualized mass     Pulmonary/Chest: [x] Respiratory effort normal.  [] No visualized signs of difficulty breathing or respiratory distress        [] Abnormal- Musculoskeletal:   [x] Normal gait with no signs of ataxia         [] Normal range of motion of neck        [] Abnormal-       Neurological:        [x] No Facial Asymmetry (Cranial nerve 7 motor function) (limited exam to video visit)          [] No gaze palsy        [] Abnormal-         Skin:        [] No significant exanthematous lesions or discoloration noted on facial skin         [] Abnormal-            Psychiatric:       [] Normal Affect [] No Hallucinations        [x] Abnormal- appears anxious  Other pertinent observable physical exam findings-      Diagnosis Orders   1. Situational anxiety     Explained that the Prozac is the more important medication for the anxiety. I have asked her to go ahead and use the prescription that she currently has. No new prescription was written today for the anxiety. Return in about 3 weeks (around 1/19/2021) for 10:30 , Julio Cesar Choi. Jyoti Hammond is a 36 y.o. female being evaluated by a Virtual Visit (video visit) encounter to address concerns as mentioned above. A caregiver was present when appropriate. Due to this being a TeleHealth encounter (During [place]-03 public health emergency), evaluation of the following organ systems was limited: Vitals/Constitutional/EENT/Resp/CV/GI//MS/Neuro/Skin/Heme-Lymph-Imm. Pursuant to the emergency declaration under the [address] [place] [address] authority and the [employer] and [employer] Act, this Virtual Visit was conducted with patient's (and/or legal guardian's) consent, to reduce the patient's risk of exposure to COVID-19 and provide necessary medical care. The patient (and/or legal guardian) has also been advised to contact this office for worsening conditions or problems, and seek emergency medical treatment and/or call 911 if deemed necessary.      Patient identification was verified at the start of the visit: Yes Total time spent on this encounter: Not billed by time    Services were provided through a video synchronous discussion virtually to substitute for in-person clinic visit. Patient and provider were located at their individual homes. --Lisa Swift MD on 12/29/2020 at 1:52 PM    An electronic signature was used to authenticate this note.

## 2021-01-19 ENCOUNTER — VIRTUAL VISIT (OUTPATIENT)
Dept: FAMILY MEDICINE CLINIC | Age: 41
End: 2021-01-19
Payer: MEDICAID

## 2021-01-19 DIAGNOSIS — F41.8 SITUATIONAL ANXIETY: ICD-10-CM

## 2021-01-19 PROCEDURE — 99213 OFFICE O/P EST LOW 20 MIN: CPT | Performed by: FAMILY MEDICINE

## 2021-01-19 RX ORDER — FLUOXETINE HYDROCHLORIDE 20 MG/1
20 CAPSULE ORAL DAILY
Qty: 30 CAPSULE | Refills: 0 | Status: SHIPPED | OUTPATIENT
Start: 2021-01-19 | End: 2022-02-21 | Stop reason: ALTCHOICE

## 2021-01-19 NOTE — PROGRESS NOTES
Allergen Reactions    Sulfa Antibiotics Itching   ,   Past Medical History:   Diagnosis Date    Anxiety     Bone spur 04/2017    left heel of foot    Depression     Diverticulosis     IBS (irritable bowel syndrome)     Menorrhagia     Migraine     Plantar fasciitis        PHYSICAL EXAMINATION:  [ INSTRUCTIONS:  \"[x]\" Indicates a positive item  \"[]\" Indicates a negative item  -- DELETE ALL ITEMS NOT EXAMINED]  Vital Signs: (As obtained by patient/caregiver or practitioner observation)    Blood pressure-  Heart rate-    Respiratory rate-    Temperature-  Pulse oximetry-     Constitutional: [x] Appears well-developed and well-nourished [x] No apparent distress      [] Abnormal-   Mental status  [x] Alert and awake  [x] Oriented to person/place/time [x]Able to follow commands      Eyes:  EOM    []  Normal  [] Abnormal-  Sclera  []  Normal  [] Abnormal -         Discharge []  None visible  [] Abnormal -    HENT:   [x] Normocephalic, atraumatic. [] Abnormal   [] Mouth/Throat: Mucous membranes are moist.     External Ears [] Normal  [] Abnormal-     Neck: [x] No visualized mass     Pulmonary/Chest: [x] Respiratory effort normal.  [x] No visualized signs of difficulty breathing or respiratory distress        [] Abnormal-      Musculoskeletal:   [x] Normal gait with no signs of ataxia         [] Normal range of motion of neck        [] Abnormal-       Neurological:        [x] No Facial Asymmetry (Cranial nerve 7 motor function) (limited exam to video visit)          [] No gaze palsy        [] Abnormal-         Skin:        [x] No significant exanthematous lesions or discoloration noted on facial skin         [] Abnormal-            Psychiatric:       [] Normal Affect [x] No Hallucinations        [x] Abnormal- flat affect  Other pertinent observable physical exam findings-        Diagnosis Orders   1.  Situational anxiety  FLUoxetine (PROZAC) 20 MG capsule Doing better, but likely, not enough time on the Prozac to see major changes. .  Continue the Prozac for now    Will ask pharmacy to fill the rest of her Ativan    Controlled Substance Monitoring:    Acute and Chronic Pain Monitoring:   RX Monitoring 3/2/2020   Periodic Controlled Substance Monitoring Possible medication side effects, risk of tolerance/dependence & alternative treatments discussed. Return in about 29 days (around 2/17/2021) for Doxy 10:15, Anxiety. Aniya Bustos is a 36 y.o. female being evaluated by a Virtual Visit (video visit) encounter to address concerns as mentioned above. A caregiver was present when appropriate. Due to this being a TeleHealth encounter (During KQKGD-14 public health emergency), evaluation of the following organ systems was limited: Vitals/Constitutional/EENT/Resp/CV/GI//MS/Neuro/Skin/Heme-Lymph-Imm. Pursuant to the emergency declaration under the [address] [place] [address], [address] [place] [address] authority and the [employer] and [employer] Act, this Virtual Visit was conducted with patient's (and/or legal guardian's) consent, to reduce the patient's risk of exposure to COVID-19 and provide necessary medical care. The patient (and/or legal guardian) has also been advised to contact this office for worsening conditions or problems, and seek emergency medical treatment and/or call 911 if deemed necessary. Patient identification was verified at the start of the visit: Yes    Total time spent on this encounter: Not billed by time    Services were provided through a video synchronous discussion virtually to substitute for in-person clinic visit. Patient and provider were located at their individual homes. --Erika Soto MD on 1/19/2021 at 1:44 PM    An electronic signature was used to authenticate this note.

## 2021-01-27 ENCOUNTER — OFFICE VISIT (OUTPATIENT)
Dept: FAMILY MEDICINE CLINIC | Age: 41
End: 2021-01-27
Payer: MEDICAID

## 2021-01-27 ENCOUNTER — TELEPHONE (OUTPATIENT)
Dept: FAMILY MEDICINE CLINIC | Age: 41
End: 2021-01-27

## 2021-01-27 VITALS
TEMPERATURE: 98.7 F | WEIGHT: 135.6 LBS | SYSTOLIC BLOOD PRESSURE: 124 MMHG | HEART RATE: 91 BPM | DIASTOLIC BLOOD PRESSURE: 80 MMHG | HEIGHT: 66 IN | OXYGEN SATURATION: 97 % | BODY MASS INDEX: 21.79 KG/M2

## 2021-01-27 DIAGNOSIS — R11.2 NAUSEA AND VOMITING, INTRACTABILITY OF VOMITING NOT SPECIFIED, UNSPECIFIED VOMITING TYPE: ICD-10-CM

## 2021-01-27 DIAGNOSIS — H60.502 ACUTE OTITIS EXTERNA OF LEFT EAR, UNSPECIFIED TYPE: Primary | ICD-10-CM

## 2021-01-27 PROCEDURE — 99213 OFFICE O/P EST LOW 20 MIN: CPT | Performed by: FAMILY MEDICINE

## 2021-01-27 RX ORDER — PROMETHAZINE HYDROCHLORIDE 25 MG/1
25 TABLET ORAL 4 TIMES DAILY PRN
Qty: 15 TABLET | Refills: 0 | Status: SHIPPED | OUTPATIENT
Start: 2021-01-27 | End: 2021-02-03

## 2021-01-27 ASSESSMENT — ENCOUNTER SYMPTOMS: VOMITING: 1

## 2021-01-27 NOTE — PATIENT INSTRUCTIONS
PLEASE BRING YOUR MEDICATIONS TO ALL APPOINTMENTS    The diagnoses and medications listed in this after visit summary may not be accurate at the time of check out. Please check MY CHART in 28-48 hours for possible corrections. Late cancellation policy: So that we can better accommodate people who are sick, please give our office 24 hour notice for an appointment cancellation. Thank you. Missed appointments: Your care is very important to us. It is important that you keep your scheduled appointments. Multiple missed appointments will lead to a dismissal from the office. Later arrival policy: If you are more than 10 minutes late for your appointment, you will be asked to reschedule. Please allow 5-7 business days for paperwork to be processed. It is important that you check your MY Chart messages, as they include appointment reminders, test results, and other important information. If you have forgotten your password, please call [phone].          1. Take your blood pressure medications at night. This reduces your chance of cardiovascular event by half  2. Fever in kids: It's best to give both Tylenol and Ibuprofen at the same time rather than staggering them which is confusing  3. Pediatric obesity is decreased by less exposure to antibiotics, consuming whole milk instead of skim milk, watching public TV instead of regular TV, and experiencing fewer adverse childhood events  4. 1 egg per day is good for your heart  5. Alternate day fasting does promote weight loss. 6. Skipping breakfast increases your risk of obesity  7. Artificially sweetened drinks increase all cause mortality (strokes, BMI, cardiovascular)  8. Kale consumption can reduce onset of dementia  9. Walking at least 8000 steps per day and resistance exercise 2-3 x per week are good for your heart  10.  Covid 19:  Wearing gloves is not that helpful  Having low vitamin D levels increases risk of infection (take 2-4000 doctor's instructions (or the directions on the label) for how many drops to use. Gently wiggle the outer ear or pull the ear up and back to help the drops get into the ear. · It's important to keep the liquid in the ear canal for 3 to 5 minutes. When should you call for help? Call your doctor now or seek immediate medical care if:    · You have a new or higher fever.     · You have new or worse pain, swelling, warmth, or redness around or behind your ear.     · You have new or increasing pus or blood draining from your ear. Watch closely for changes in your health, and be sure to contact your doctor if:    · You are not getting better after 2 days (48 hours). Where can you learn more? Go to https://[employer] org and sign in to your [employer] account. Enter C706 in the Socogame box to learn more about \"Swimmer's Ear: Care Instructions. \"     If you do not have an account, please click on the \"Sign Up Now\" link. Current as of: April 15, 2020               Content Version: 12.6  © 7514-8509 [employer]. Care instructions adapted under license by [facility] ([facility]). If you have questions about a medical condition or this instruction, always ask your healthcare professional. [address] any warranty or liability for your use of this information.

## 2021-01-27 NOTE — PROGRESS NOTES
Patient ID: Aniya Bustos 1980    Chief Complaint   Patient presents with    Otalgia     left, drainage    Emesis     X 2 days         HPI     Left ear pain: Ongoing since last night. Drainage from the ear. Admits to submerging her head underwater when she washes her hair. Used Q-tips last night. Denies any other objects in the ears. Has not gone swimming recently    Vomiting: Has been nauseated. Is not pregnant. Review of Systems   Constitutional: Positive for fatigue. Gastrointestinal: Positive for vomiting. Patient Active Problem List   Diagnosis    Gastroesophageal reflux disease without esophagitis    Bone spur on posterior portion of calcaneus    Anxiety       Past Surgical History:   Procedure Laterality Date    DILATION AND CURETTAGE OF UTERUS  02/2017       Family History   Problem Relation Age of Onset    No Known Problems Mother     No Known Problems Father     No Known Problems Sister     No Known Problems Brother        Current Outpatient Medications on File Prior to Visit   Medication Sig Dispense Refill    FLUoxetine (PROZAC) 20 MG capsule Take 1 capsule by mouth daily 30 capsule 0    albuterol sulfate HFA (PROVENTIL HFA) 108 (90 Base) MCG/ACT inhaler Inhale 2 puffs into the lungs every 4 hours as needed for Wheezing or Shortness of Breath With spacer (and mask if indicated). Thanks. 1 Inhaler 1    fluticasone (FLONASE) 50 MCG/ACT nasal spray 1 spray by Nasal route daily 1 Bottle 2    LORazepam (ATIVAN) 0.5 MG tablet Take 1 tablet by mouth daily as needed for Anxiety for up to 90 days.  15 tablet 0    omeprazole (PRILOSEC) 40 MG delayed release capsule Take 1 capsule by mouth every morning (before breakfast) 90 capsule 3    Ibuprofen (IBU-200 PO) Take by mouth      Spacer/Aero-Holding Chambers (E-Z SPACER) JUAQUIN 1 Device by Does not apply route daily as needed (for use with inhaler for wheezing) 1 Device 0     No current facility-administered medications on file prior to visit. Objective:           Physical Exam  Vitals signs and nursing note reviewed. Constitutional:       General: She is not in acute distress. Appearance: She is well-developed. HENT:      Head: Normocephalic and atraumatic. Right Ear: Hearing, tympanic membrane and external ear normal.      Left Ear: Hearing and tympanic membrane normal. Tympanic membrane is not injected. Ears:      Comments: Pain on putting otoscope tip into the ear. Nose: Nose normal. No nasal deformity, laceration, mucosal edema or rhinorrhea. Right Sinus: No maxillary sinus tenderness or frontal sinus tenderness. Left Sinus: No maxillary sinus tenderness or frontal sinus tenderness. Mouth/Throat:      Pharynx: No oropharyngeal exudate or posterior oropharyngeal erythema. Eyes:      Conjunctiva/sclera: Conjunctivae normal.   Neck:      Thyroid: No thyromegaly. Trachea: No tracheal deviation. Cardiovascular:      Rate and Rhythm: Normal rate and regular rhythm. Heart sounds: Normal heart sounds, S1 normal and S2 normal. No friction rub. No gallop. Pulmonary:      Effort: No respiratory distress. Breath sounds: No wheezing or rales. Lymphadenopathy:      Head:      Right side of head: No submental, submandibular or posterior auricular adenopathy. Left side of head: No submental, submandibular or posterior auricular adenopathy. Cervical:      Right cervical: No superficial, deep or posterior cervical adenopathy. Left cervical: No superficial, deep or posterior cervical adenopathy. Skin:     General: Skin is warm and dry. Psychiatric:         Behavior: Behavior normal.       Vitals:    01/27/21 1446   BP: 124/80   Pulse: 91   Temp: 98.7 °F (37.1 °C)   TempSrc: Infrared   SpO2: 97%   Weight: 135 lb 9.6 oz (61.5 kg)   Height: 5' 5.5\" (1.664 m)     Body mass index is 22.22 kg/m².      Wt Readings from Last 3 Encounters:   01/27/21 135 lb 9.6 oz (61.5 kg)   09/08/20 137 lb (62.1 kg)   08/03/20 120 lb (54.4 kg)     BP Readings from Last 3 Encounters:   01/27/21 124/80   09/08/20 118/78   08/03/20 109/66          No results found for this visit on 01/27/21. Assessment:       Diagnosis Orders   1. Acute otitis externa of left ear, unspecified type  neomycin-polymyxin-hydrocortisone (CORTISPORIN) 3.5-56447-1 otic solution   2. Nausea and vomiting, intractability of vomiting not specified, unspecified vomiting type  promethazine (PHENERGAN) 25 MG tablet           Plan:      Avoid submerging her head for hair washing. Avoid foreign objects in the ears. Recheck as needed. Return if symptoms worsen or fail to improve.

## 2021-04-26 ENCOUNTER — VIRTUAL VISIT (OUTPATIENT)
Dept: FAMILY MEDICINE CLINIC | Age: 41
End: 2021-04-26
Payer: MEDICAID

## 2021-04-26 DIAGNOSIS — F41.8 SITUATIONAL ANXIETY: ICD-10-CM

## 2021-04-26 PROCEDURE — 99213 OFFICE O/P EST LOW 20 MIN: CPT | Performed by: FAMILY MEDICINE

## 2021-04-26 PROCEDURE — G8427 DOCREV CUR MEDS BY ELIG CLIN: HCPCS | Performed by: FAMILY MEDICINE

## 2021-04-26 RX ORDER — LORAZEPAM 0.5 MG/1
0.5 TABLET ORAL DAILY PRN
Qty: 15 TABLET | Refills: 1 | Status: SHIPPED | OUTPATIENT
Start: 2021-04-26 | End: 2022-02-21 | Stop reason: SDUPTHER

## 2021-04-26 ASSESSMENT — PATIENT HEALTH QUESTIONNAIRE - PHQ9
1. LITTLE INTEREST OR PLEASURE IN DOING THINGS: 0
SUM OF ALL RESPONSES TO PHQ QUESTIONS 1-9: 0
SUM OF ALL RESPONSES TO PHQ QUESTIONS 1-9: 0

## 2021-04-26 NOTE — PROGRESS NOTES
2021    TELEHEALTH EVALUATION -- Audio/Visual (During QBKPJ-34 public health emergency)    HPI:    Ifeanyi Paz (:  1980) has requested an audio/video evaluation for the following concern(s): Anxiety: Was told that her mood worsened while she was on the Prozac. She has since run out of it. She admits to not following up as she should have. She has anxiety attacks once every 1 to 2 weeks. Otherwise she is doing okay      Review of Systems    Prior to Visit Medications    Medication Sig Taking? Authorizing Provider   LORazepam (ATIVAN) 0.5 MG tablet Take 1 tablet by mouth daily as needed for Anxiety for up to 90 days. Needs to last 90 days Yes Bernardo Smith MD   fluticasone [facility]) 50 MCG/ACT nasal spray 1 spray by Nasal route daily Yes Bernardo Smith MD   omeprazole (PRILOSEC) 40 MG delayed release capsule Take 1 capsule by mouth every morning (before breakfast) Yes Bernardo Smith MD   Ibuprofen (IBU-200 PO) Take by mouth Yes Historical Provider, MD   Spacer/Aero-Holding Chambers (E-Z SPACER) JUAQUIN 1 Device by Does not apply route daily as needed (for use with inhaler for wheezing) Yes Arlyn Mendoza MD   FLUoxetine (PROZAC) 20 MG capsule Take 1 capsule by mouth daily  Patient not taking: Reported on 2021  Bernardo Smith MD   albuterol sulfate HFA (PROVENTIL HFA) 108 (90 Base) MCG/ACT inhaler Inhale 2 puffs into the lungs every 4 hours as needed for Wheezing or Shortness of Breath With spacer (and mask if indicated). Thanks.   Patient not taking: Reported on 2021  Bernardo Smith MD       Social History     Tobacco Use    Smoking status: Current Every Day Smoker     Packs/day: 1.00     Types: Cigarettes     Start date:     Smokeless tobacco: Never Used   Substance Use Topics    Alcohol use: Yes     Comment: soc    Drug use: No        Allergies   Allergen Reactions    Sulfa Antibiotics Itching   ,   Past Medical History:   Diagnosis Date    Anxiety     Bone spur 2017 left heel of foot    Depression     Diverticulosis     IBS (irritable bowel syndrome)     Menorrhagia     Migraine     Plantar fasciitis        PHYSICAL EXAMINATION:  [ INSTRUCTIONS:  \"[x]\" Indicates a positive item  \"[]\" Indicates a negative item  -- DELETE ALL ITEMS NOT EXAMINED]  Vital Signs: (As obtained by patient/caregiver or practitioner observation)    Blood pressure-  Heart rate-    Respiratory rate-    Temperature-  Pulse oximetry-     Constitutional: [x] Appears well-developed and well-nourished [x] No apparent distress      [] Abnormal-   Mental status  [x] Alert and awake  [x] Oriented to person/place/time [x]Able to follow commands      Eyes:  EOM    []  Normal  [] Abnormal-  Sclera  []  Normal  [] Abnormal -         Discharge []  None visible  [] Abnormal -    HENT:   [x] Normocephalic, atraumatic. [] Abnormal   [] Mouth/Throat: Mucous membranes are moist.     External Ears [] Normal  [] Abnormal-     Neck: [x] No visualized mass     Pulmonary/Chest: [x] Respiratory effort normal.  [x] No visualized signs of difficulty breathing or respiratory distress        [] Abnormal-      Musculoskeletal:   [x] Normal gait with no signs of ataxia         [] Normal range of motion of neck        [] Abnormal-       Neurological:        [x] No Facial Asymmetry (Cranial nerve 7 motor function) (limited exam to video visit)          [x] No gaze palsy        [] Abnormal-         Skin:        [x] No significant exanthematous lesions or discoloration noted on facial skin         [] Abnormal-            Psychiatric:       [x] Normal Affect [x] No Hallucinations        [] Abnormal-     Other pertinent observable physical exam findings-        Diagnosis Orders   1.  Situational anxiety  LORazepam (ATIVAN) 0.5 MG tablet     Controlled Substance Monitoring:    Acute and Chronic Pain Monitoring:   RX Monitoring 4/27/2021   Periodic Controlled Substance Monitoring Possible medication side effects, risk of tolerance/dependence & alternative treatments discussed. ;No signs of potential drug abuse or diversion identified. Anxiety seems to be sporadic. We will just give as needed Ativan. She knows that she cannot drive after taking Ativan. She is not to mix it with alcohol    Follow-up in 6 months. We will do physical and Pap smear then. Return in about 6 months (around 10/26/2021) for Physical, pap, anxiety 8:45, Fasting. Chance Ferreiraer, was evaluated through a synchronous (real-time) audio-video encounter. The patient (or guardian if applicable) is aware that this is a billable service. Verbal consent to proceed has been obtained within the past 12 months. The visit was conducted pursuant to the emergency declaration under the [address] [place] [address], [address] [place] [address] authority and the [employer] and [employer] Act. Patient identification was verified, and a caregiver was present when appropriate. The patient was located in a state where the provider was credentialed to provide care. Total time spent on this encounter: Not billed by time    --Ubaldo Swanson MD on 4/27/2021 at 9:55 AM    An electronic signature was used to authenticate this note.

## 2021-05-08 ENCOUNTER — HOSPITAL ENCOUNTER (EMERGENCY)
Age: 41
Discharge: HOME | End: 2021-05-08
Payer: MEDICAID

## 2021-05-08 VITALS
TEMPERATURE: 97.6 F | SYSTOLIC BLOOD PRESSURE: 138 MMHG | DIASTOLIC BLOOD PRESSURE: 72 MMHG | RESPIRATION RATE: 16 BRPM | HEART RATE: 90 BPM | OXYGEN SATURATION: 100 %

## 2021-05-08 DIAGNOSIS — R11.2 NON-INTRACTABLE VOMITING WITH NAUSEA, UNSPECIFIED VOMITING TYPE: ICD-10-CM

## 2021-05-08 DIAGNOSIS — R51.9 ACUTE NONINTRACTABLE HEADACHE, UNSPECIFIED HEADACHE TYPE: Primary | ICD-10-CM

## 2021-05-08 PROCEDURE — 99285 EMERGENCY DEPT VISIT HI MDM: CPT

## 2021-05-08 PROCEDURE — 96374 THER/PROPH/DIAG INJ IV PUSH: CPT

## 2021-05-08 PROCEDURE — 6360000002 HC RX W HCPCS: Performed by: PHYSICIAN ASSISTANT

## 2021-05-08 PROCEDURE — 96375 TX/PRO/DX INJ NEW DRUG ADDON: CPT

## 2021-05-08 PROCEDURE — 2580000003 HC RX 258: Performed by: PHYSICIAN ASSISTANT

## 2021-05-08 RX ORDER — PROCHLORPERAZINE EDISYLATE 5 MG/ML
10 INJECTION INTRAMUSCULAR; INTRAVENOUS ONCE
Status: COMPLETED | OUTPATIENT
Start: 2021-05-08 | End: 2021-05-08

## 2021-05-08 RX ORDER — 0.9 % SODIUM CHLORIDE 0.9 %
1000 INTRAVENOUS SOLUTION INTRAVENOUS ONCE
Status: COMPLETED | OUTPATIENT
Start: 2021-05-08 | End: 2021-05-08

## 2021-05-08 RX ORDER — DIPHENHYDRAMINE HYDROCHLORIDE 50 MG/ML
50 INJECTION INTRAMUSCULAR; INTRAVENOUS ONCE
Status: COMPLETED | OUTPATIENT
Start: 2021-05-08 | End: 2021-05-08

## 2021-05-08 RX ADMIN — SODIUM CHLORIDE 1000 ML: 9 INJECTION, SOLUTION INTRAVENOUS at 14:17

## 2021-05-08 RX ADMIN — PROCHLORPERAZINE EDISYLATE 10 MG: 5 INJECTION INTRAMUSCULAR; INTRAVENOUS at 14:17

## 2021-05-08 RX ADMIN — DIPHENHYDRAMINE HYDROCHLORIDE 50 MG: 50 INJECTION, SOLUTION INTRAMUSCULAR; INTRAVENOUS at 14:17

## 2021-05-08 NOTE — ED PROVIDER NOTES
Jose      PCP: Elen Franco MD    [facility]    Chief Complaint   Patient presents with    Headache    Nausea       This patient was not evaluated by the attending physician. I have independently evaluated this patient. HPI    Rosetta Chaudhari is a 39 y.o. female who presents with headache since this morning. Patient states she has history of headaches and current headache feels similar. Headache is located in the frontal and posterior portion of the head. Patient describes headache as throbbing. Patient states she has associated neck pain which is similar with previous headaches. Patient denies fever, chest pain or shortness of breath. Patient has associated nausea vomiting and sensitivity to light. REVIEW OF SYSTEMS   Constitutional:  Denies fever  Neurologic:  See HPI. Denies weakness or sensory changes   Eyes:   Denies discharge, dipplopia, blurred vision, or loss visual field  HENT:  Denies sore throat or ear pain   Cardiovascular:  Denies chest pain, palpitations. Respiratory:  Denies cough, shortness of breath, respiratory discomfort   GI  Denies abdominal pain. Denies diarrhea. :  Denies Dysuria or Hematuria. Adamantly denies pregnancy  Musculoskeletal:  Denies back pain.      Skin:  Denies rash   Lymphatic:  Denies swollen glands      All other review of systems negative at this time  See HPI and nursing notes for additional information      PAST MEDICAL & SURGICAL HISTORY    Past Medical History:   Diagnosis Date    Anxiety     Bone spur 04/2017    left heel of foot    Depression     Diverticulosis     IBS (irritable bowel syndrome)     Menorrhagia     Migraine     Plantar fasciitis      Past Surgical History:   Procedure Laterality Date    DILATION AND CURETTAGE OF UTERUS  02/2017       CURRENT MEDICATIONS    Current Outpatient Rx   Medication Sig Dispense Refill    LORazepam (ATIVAN) 0.5 MG tablet Take 1 tablet by mouth daily as needed for Anxiety for up to 90 days. Needs to last 90 days 15 tablet 1    FLUoxetine (PROZAC) 20 MG capsule Take 1 capsule by mouth daily (Patient not taking: Reported on 4/26/2021) 30 capsule 0    albuterol sulfate HFA (PROVENTIL HFA) 108 (90 Base) MCG/ACT inhaler Inhale 2 puffs into the lungs every 4 hours as needed for Wheezing or Shortness of Breath With spacer (and mask if indicated). Thanks.  (Patient not taking: Reported on 4/26/2021) 1 Inhaler 1    fluticasone (FLONASE) 50 MCG/ACT nasal spray 1 spray by Nasal route daily 1 Bottle 2    omeprazole (PRILOSEC) 40 MG delayed release capsule Take 1 capsule by mouth every morning (before breakfast) 90 capsule 3    Ibuprofen (IBU-200 PO) Take by mouth      Spacer/Aero-Holding Chambers (E-Z SPACER) JUAQUIN 1 Device by Does not apply route daily as needed (for use with inhaler for wheezing) 1 Device 0       ALLERGIES    Allergies   Allergen Reactions    Sulfa Antibiotics Itching       SOCIAL & FAMILY HISTORY    Social History     Socioeconomic History    Marital status: Single     Spouse name: None    Number of children: 3    Years of education: None    Highest education level: None   Occupational History    Occupation: [employer]   Social Needs    Financial resource strain: None    Food insecurity     Worry: None     Inability: None    Transportation needs     Medical: None     Non-medical: None   Tobacco Use    Smoking status: Current Every Day Smoker     Packs/day: 1.00     Types: Cigarettes     Start date: 1997    Smokeless tobacco: Never Used   Substance and Sexual Activity    Alcohol use: Yes     Comment: soc    Drug use: No    Sexual activity: Yes     Partners: Male   Lifestyle    Physical activity     Days per week: None     Minutes per session: None    Stress: None   Relationships    Social connections     Talks on phone: None     Gets together: None     Attends Voodoo service: None     Active member of club or organization: None     Attends better. I do not believe patient has brain bleed, meningitis or stroke. I recommend follow-up with primary care provider in 2 days for recheck. Clinical  IMPRESSION    1. Acute nonintractable headache, unspecified headache type    2. Non-intractable vomiting with nausea, unspecified vomiting type          Diagnosis and plan discussed in detail with patient who understands and agrees. Return to emergency Department precautions, which included any change in nature or severity of headaches or any new symptoms, were discussed in detail with patient who understands and agrees. Comment: Please note this report has been produced using speech recognition software and may contain errors related to that system including errors in grammar, punctuation, and spelling, as well as words and phrases that may be inappropriate. If there are any questions or concerns please feel free to contact the dictating provider for clarification.             Vilma Quintana PA-C  05/08/21 7048

## 2021-05-08 NOTE — LETTER
[facility]  [address] [address]  Phone: [phone]  Fax: [phone]               May 8, 2021    Patient: Segundo Franco   YOB: 1980   Date of Visit: 5/8/2021       To Whom It May Concern:    Raymundo Graf was seen and treated in our emergency department on 5/8/2021. She may return to work on 5/9/21.       Sincerely,       Nurse        Signature:__________________________________

## 2021-05-10 PROBLEM — Z86.010 HISTORY OF COLON POLYPS: Status: ACTIVE | Noted: 2021-05-10

## 2021-05-10 PROBLEM — Z86.0100 HISTORY OF COLON POLYPS: Status: ACTIVE | Noted: 2021-05-10

## 2021-06-03 ENCOUNTER — VIRTUAL VISIT (OUTPATIENT)
Dept: FAMILY MEDICINE CLINIC | Age: 41
End: 2021-06-03
Payer: MEDICAID

## 2021-06-03 DIAGNOSIS — R05.9 COUGH: Primary | ICD-10-CM

## 2021-06-03 PROCEDURE — G8427 DOCREV CUR MEDS BY ELIG CLIN: HCPCS | Performed by: FAMILY MEDICINE

## 2021-06-03 PROCEDURE — 99213 OFFICE O/P EST LOW 20 MIN: CPT | Performed by: FAMILY MEDICINE

## 2021-06-03 RX ORDER — BENZONATATE 100 MG/1
100-200 CAPSULE ORAL 3 TIMES DAILY PRN
Qty: 30 CAPSULE | Refills: 0 | Status: SHIPPED | OUTPATIENT
Start: 2021-06-03 | End: 2021-06-10

## 2021-06-03 RX ORDER — AZITHROMYCIN 250 MG/1
250 TABLET, FILM COATED ORAL SEE ADMIN INSTRUCTIONS
Qty: 6 TABLET | Refills: 0 | Status: SHIPPED | OUTPATIENT
Start: 2021-06-03 | End: 2021-06-08

## 2021-06-03 NOTE — PATIENT INSTRUCTIONS
Need help scheduling your covid vaccine? [address] [phone] or Call [facility] hotline: [phone]         PLEASE BRING YOUR MEDICATIONS TO ALL APPOINTMENTS    The diagnoses and medications listed in this after visit summary may not be accurate at the time of check out. Please check MY CHART in 28-48 hours for possible corrections. Late cancellation policy: So that we can better accommodate people who are sick, please give our office 24 hour notice for an appointment cancellation. Thank you. Missed appointments: Your care is very important to us. It is important that you keep your scheduled appointments. Multiple missed appointments will lead to a dismissal from the office. Later arrival policy: If you are more than 10 minutes late for your appointment, you will be asked to re-schedule. Please allow 5-7 business days for paperwork to be processed. It is important that you check your MY Chart messages, as they include appointment reminders, test results, and other important information. If you have forgotten your password, please call [phone]. HERE ARE SOME LIFE CHANGING TIPS      1. Take your blood pressure medications at bedtime. This reduces your chance of cardiovascular event by half  2. Fever in kids:  Give both Tylenol and Ibuprofen at the same time rather than staggering them which is confusing  3. Follow these tips to reduce childhood obesity: Reduce unnecessary exposure to antibiotics, consume whole milk instead of skim milk, watch public TV instead of regular TV (less exposure to junk food commercials), and reduce traumatic experiences. 4. 1 egg per day is good for your heart  5. Alternate day fasting does promote weight loss. Skipping breakfast increases your risk of obesity  6. Artificially sweetened drinks increase all cause mortality (strokes, BMI, cardiovascular)  7.  Kale consumption can reduce onset of dementia 8. Walking at least 8000 steps per day and resistance exercise 2-3 x per week are good for your heart  9.  Brushing teeth 3 times per day can decrease chance of getting diabetes

## 2021-06-03 NOTE — PROGRESS NOTES
6/3/2021    TELEHEALTH EVALUATION -- Audio/Visual (During [facility]-12 public health emergency)    HPI:    Vince Perez (:  1980) has requested an audio/video evaluation for the following concern(s):    Chief Complaint   Patient presents with    URI     cough, fever, sore throat, SOB, Chills, Bodyaches, fatigue x 6 days   fatigue. Severe cough. Runny nose. Using flonase everyday. Temp 99.7 but has not had the Covid vaccine. Review of Systems    Prior to Visit Medications    Medication Sig Taking? Authorizing Provider   LORazepam (ATIVAN) 0.5 MG tablet Take 1 tablet by mouth daily as needed for Anxiety for up to 90 days. Needs to last 90 days Yes Zaid Brennan MD   fluticasone [facility]) 50 MCG/ACT nasal spray 1 spray by Nasal route daily Yes Zaid Brennan MD   omeprazole (PRILOSEC) 40 MG delayed release capsule Take 1 capsule by mouth every morning (before breakfast) Yes Zaid Brennan MD   FLUoxetine (PROZAC) 20 MG capsule Take 1 capsule by mouth daily  Patient not taking: Reported on 2021  Zaid Brennan MD   albuterol sulfate HFA (PROVENTIL HFA) 108 (90 Base) MCG/ACT inhaler Inhale 2 puffs into the lungs every 4 hours as needed for Wheezing or Shortness of Breath With spacer (and mask if indicated). Thanks.   Patient not taking: Reported on 2021  Zaid Brennan MD   Ibuprofen (IBU-200 PO) Take by mouth  Patient not taking: Reported on 6/3/2021  Historical Provider, MD   Spacer/Aero-Holding Chambers (E-Z SPACER) JUAQUIN 1 Device by Does not apply route daily as needed (for use with inhaler for wheezing)  Patient not taking: Reported on 6/3/2021  Jhonathan Jackson MD       Social History     Tobacco Use    Smoking status: Current Every Day Smoker     Packs/day: 1.00     Types: Cigarettes     Start date: 0    Smokeless tobacco: Never Used   Substance Use Topics    Alcohol use: Yes     Comment: soc    Drug use: No        Allergies   Allergen Reactions    Sulfa Antibiotics Itching   , Past Medical History:   Diagnosis Date    Anxiety     Bone spur 04/2017    left heel of foot    Depression     Diverticulosis     IBS (irritable bowel syndrome)     Menorrhagia     Migraine     Plantar fasciitis        PHYSICAL EXAMINATION:  [ INSTRUCTIONS:  \"[x]\" Indicates a positive item  \"[]\" Indicates a negative item  -- DELETE ALL ITEMS NOT EXAMINED]  Vital Signs: (As obtained by patient/caregiver or practitioner observation)    Blood pressure-  Heart rate-    Respiratory rate-    Temperature-  Pulse oximetry-     Constitutional: [x] Appears well-developed and well-nourished [x] No apparent distress      [] Abnormal-appears ill    Mental status  [x] Alert and awake  [x] Oriented to person/place/time [x]Able to follow commands      Eyes:  EOM    []  Normal  [] Abnormal-  Sclera  []  Normal  [] Abnormal -         Discharge []  None visible  [] Abnormal -    HENT:   [x] Normocephalic, atraumatic. [] Abnormal   [] Mouth/Throat: Mucous membranes are moist.     External Ears [x] Normal  [] Abnormal-     Neck: [x] No visualized mass     Pulmonary/Chest: [x] Respiratory effort normal.  [x] No visualized signs of difficulty breathing or respiratory distress        [] Abnormal-      Musculoskeletal:   [x] Normal gait with no signs of ataxia         [] Normal range of motion of neck        [] Abnormal-       Neurological:        [x] No Facial Asymmetry (Cranial nerve 7 motor function) (limited exam to video visit)          [x] No gaze palsy        [] Abnormal-         Skin:        [x] No significant exanthematous lesions or discoloration noted on facial skin         [] Abnormal-            Psychiatric:       [x] Normal Affect [x] No Hallucinations        [] Abnormal-     Other pertinent observable physical exam findings-     ASSESSMENT/PLAN:  There are no diagnoses linked to this encounter. Diagnosis Orders   1.  Cough  COVID-19 Ambulatory    azithromycin (ZITHROMAX) 250 MG tablet    benzonatate (TESSALON) 100 MG capsule     Explained that a temperature of 99 is not a fever. She can take Tylenol and ibuprofen for discomfort. Off work until the results are back. Strongly urged her to get the Covid vaccine. No follow-ups on file. Mani Ruiz, was evaluated through a synchronous (real-time) audio-video encounter. The patient (or guardian if applicable) is aware that this is a billable service. Verbal consent to proceed has been obtained within the past 12 months. The visit was conducted pursuant to the emergency declaration under the [address] [place] [address], [address] [place] [address] authority and the [employer] General Act. Patient identification was verified, and a caregiver was present when appropriate. The patient was located in a state where the provider was credentialed to provide care. Total time spent on this encounter: Not billed by time    --Collin Muñiz MD on 6/3/2021 at 3:15 PM    An electronic signature was used to authenticate this note.

## 2021-06-04 ENCOUNTER — HOSPITAL ENCOUNTER (OUTPATIENT)
Age: 41
Setting detail: SPECIMEN
Discharge: HOME | End: 2021-06-04
Payer: MEDICAID

## 2021-06-04 LAB
SARS-COV-2: NOT DETECTED
SOURCE: NORMAL

## 2021-06-04 PROCEDURE — U0003 INFECTIOUS AGENT DETECTION BY NUCLEIC ACID (DNA OR RNA); SEVERE ACUTE RESPIRATORY SYNDROME CORONAVIRUS 2 (SARS-COV-2) (CORONAVIRUS DISEASE [COVID-19]), AMPLIFIED PROBE TECHNIQUE, MAKING USE OF HIGH THROUGHPUT TECHNOLOGIES AS DESCRIBED BY CMS-2020-01-R: HCPCS

## 2021-06-04 PROCEDURE — U0005 INFEC AGEN DETEC AMPLI PROBE: HCPCS

## 2021-07-21 ENCOUNTER — TELEPHONE (OUTPATIENT)
Dept: FAMILY MEDICINE CLINIC | Age: 41
End: 2021-07-21

## 2021-09-08 ENCOUNTER — TELEPHONE (OUTPATIENT)
Dept: FAMILY MEDICINE CLINIC | Age: 41
End: 2021-09-08

## 2021-09-08 ENCOUNTER — NURSE TRIAGE (OUTPATIENT)
Dept: OTHER | Facility: CLINIC | Age: 41
End: 2021-09-08

## 2021-11-08 ENCOUNTER — TELEPHONE (OUTPATIENT)
Dept: FAMILY MEDICINE CLINIC | Age: 41
End: 2021-11-08

## 2021-11-08 NOTE — TELEPHONE ENCOUNTER
----- Message from Catalina Mcclure sent at 11/8/2021 10:27 AM EST -----  Subject: Message to Provider    QUESTIONS  Information for Provider? Patient requesting information to contact   counselling service located next door to the office. Patient experiencing   fatigue due to stress for the past week.  ---------------------------------------------------------------------------  --------------  CALL BACK INFO  What is the best way for the office to contact you? OK to leave message on   voicemail  Preferred Call Back Phone Number? [phone]  ---------------------------------------------------------------------------  --------------  SCRIPT ANSWERS  Relationship to Patient?  Self

## 2022-01-10 ENCOUNTER — HOSPITAL ENCOUNTER (OUTPATIENT)
Age: 42
Setting detail: SPECIMEN
Discharge: HOME | End: 2022-01-10
Payer: MEDICAID

## 2022-01-10 ENCOUNTER — VIRTUAL VISIT (OUTPATIENT)
Dept: FAMILY MEDICINE CLINIC | Age: 42
End: 2022-01-10
Payer: MEDICAID

## 2022-01-10 DIAGNOSIS — J02.9 SORE THROAT: Primary | ICD-10-CM

## 2022-01-10 DIAGNOSIS — J34.89 SINUS PRESSURE: ICD-10-CM

## 2022-01-10 DIAGNOSIS — J02.9 SORE THROAT: ICD-10-CM

## 2022-01-10 LAB — S PYO AG THROAT QL: NORMAL

## 2022-01-10 PROCEDURE — U0005 INFEC AGEN DETEC AMPLI PROBE: HCPCS

## 2022-01-10 PROCEDURE — 99213 OFFICE O/P EST LOW 20 MIN: CPT | Performed by: FAMILY MEDICINE

## 2022-01-10 PROCEDURE — G8427 DOCREV CUR MEDS BY ELIG CLIN: HCPCS | Performed by: FAMILY MEDICINE

## 2022-01-10 PROCEDURE — 87880 STREP A ASSAY W/OPTIC: CPT | Performed by: FAMILY MEDICINE

## 2022-01-10 PROCEDURE — U0003 INFECTIOUS AGENT DETECTION BY NUCLEIC ACID (DNA OR RNA); SEVERE ACUTE RESPIRATORY SYNDROME CORONAVIRUS 2 (SARS-COV-2) (CORONAVIRUS DISEASE [COVID-19]), AMPLIFIED PROBE TECHNIQUE, MAKING USE OF HIGH THROUGHPUT TECHNOLOGIES AS DESCRIBED BY CMS-2020-01-R: HCPCS

## 2022-01-10 NOTE — LETTER
[place] [address] [address]  Phone: [phone]  Fax: [phone]    Elen Palmer MD        January 10, 2022     Patient: Rich Mora   YOB: 1980   Date of Visit: 1/10/2022       To Whom it May Concern:    Leandro Jones was seen in my clinic on 1/10/2022. Please excuse patient from work on 1/8/2022 through 1/11/2022. She may return to work on 1/12/2022. If you have any questions or concerns, please don't hesitate to call.     Sincerely,         Elen Palmer MD

## 2022-01-10 NOTE — LETTER
[facility]  [address]  Phone: [phone]  Fax: [phone]    Clarence Sandoval MD        January 10, 2022     Patient: Rosaline Ventura   YOB: 1980   Date of Visit: 1/10/2022       To Whom it May Concern:    Kenzie Domínguez was seen in my clinic on 1/10/2022. Please excuse patient from work on 1/8/2022 through 1/18/2022. She may return to work on 1/19/2022. If you have any questions or concerns, please don't hesitate to call.     Sincerely,         Clarence Sandoval MD

## 2022-01-10 NOTE — PROGRESS NOTES
1/10/2022    TELEHEALTH EVALUATION -- Audio/Visual (During [facility]-63 public health emergency)    HPI:    Edvin Queen (:  1980) has requested an audio/video evaluation for the following concern(s):    Chief Complaint   Patient presents with    Pharyngitis    Headache    Sinusitis    Generalized Body Aches   sick x 2 days. F to 101.7. Chills and sweats. Works at a bar. Has not been vaccinated against covid. Taking Tylenol for pain which helps a little bit. Review of Systems    Prior to Visit Medications    Medication Sig Taking? Authorizing Provider   omeprazole (PRILOSEC) 40 MG delayed release capsule TAKE ONE CAPSULE BY MOUTH EVERY MORNING BEFORE BREAKFAST  Patient not taking: Reported on 1/10/2022  Romi Weaver MD   FLUoxetine (PROZAC) 20 MG capsule Take 1 capsule by mouth daily  Patient not taking: Reported on 2021  Main Lopez MD   albuterol sulfate HFA (PROVENTIL HFA) 108 (90 Base) MCG/ACT inhaler Inhale 2 puffs into the lungs every 4 hours as needed for Wheezing or Shortness of Breath With spacer (and mask if indicated). Thanks.   Patient not taking: Reported on 2021  Main Lopez MD   fluticasone [facility]) 50 MCG/ACT nasal spray 1 spray by Nasal route daily  Patient not taking: Reported on 1/10/2022  Main Lopez MD   Ibuprofen (IBU-200 PO) Take by mouth  Patient not taking: Reported on 6/3/2021  Historical Provider, MD   Spacer/Aero-Holding Chambers (E-Z SPACER) JUAQUIN 1 Device by Does not apply route daily as needed (for use with inhaler for wheezing)  Patient not taking: Reported on 6/3/2021  Iván Sue MD       Social History     Tobacco Use    Smoking status: Current Every Day Smoker     Packs/day: 1.00     Types: Cigarettes     Start date:     Smokeless tobacco: Never Used   Substance Use Topics    Alcohol use: Yes     Comment: soc    Drug use: No        Allergies   Allergen Reactions    Sulfa Antibiotics Itching   ,   Past Medical History: Diagnosis Date    Anxiety     Bone spur 04/2017    left heel of foot    Depression     Diverticulosis     IBS (irritable bowel syndrome)     Menorrhagia     Migraine     Plantar fasciitis    ,   Past Surgical History:   Procedure Laterality Date    DILATION AND CURETTAGE OF UTERUS  02/2017       PHYSICAL EXAMINATION:  [ INSTRUCTIONS:  \"[x]\" Indicates a positive item  \"[]\" Indicates a negative item  -- DELETE ALL ITEMS NOT EXAMINED]  Vital Signs: (As obtained by patient/caregiver or practitioner observation)    Blood pressure-  Heart rate-    Respiratory rate-    Temperature-  Pulse oximetry-     Constitutional: [] Appears well-developed and well-nourished [] No apparent distress      [x] Abnormal- appears ill. Voice is husky  Mental status  [x] Alert and awake  [x] Oriented to person/place/time [x]Able to follow commands      Eyes:  EOM    [x]  Normal  [] Abnormal-  Sclera  []  Normal  [] Abnormal -         Discharge []  None visible  [] Abnormal -    HENT:   [x] Normocephalic, atraumatic. [] Abnormal   [] Mouth/Throat: Mucous membranes are moist.     External Ears [x] Normal  [] Abnormal-     Neck: [x] No visualized mass     Pulmonary/Chest: [x] Respiratory effort normal.  [x] No visualized signs of difficulty breathing or respiratory distress        [] Abnormal-      Musculoskeletal:   [x] Normal gait with no signs of ataxia         [x] Normal range of motion of neck        [] Abnormal-       Neurological:        [x] No Facial Asymmetry (Cranial nerve 7 motor function) (limited exam to video visit)          [] No gaze palsy        [] Abnormal-         Skin:        [x] No significant exanthematous lesions or discoloration noted on facial skin         [] Abnormal-            Psychiatric:       [x] Normal Affect [x] No Hallucinations        [] Abnormal-     Other pertinent observable physical exam findings-     ASSESSMENT/PLAN:  There are no diagnoses linked to this encounter. Diagnosis Orders   1.  Sore throat  COVID-19    POCT rapid strep A   2. Sinus pressure  COVID-19     Patient likely has covid    No follow-ups on file. Fito Stark, was evaluated through a synchronous (real-time) audio-video encounter. The patient (or guardian if applicable) is aware that this is a billable service. Verbal consent to proceed has been obtained within the past 12 months. The visit was conducted pursuant to the emergency declaration under the [address] [place] [address] and the [employer] General Act. Patient identification was verified, and a caregiver was present when appropriate. The patient was located in a state where the provider was credentialed to provide care. Total time spent on this encounter: Not billed by time    --Adria Bolanos MD on 1/10/2022 at 10:43 AM    An electronic signature was used to authenticate this note.

## 2022-01-11 LAB
SARS-COV-2: DETECTED
SOURCE: ABNORMAL

## 2022-01-28 ENCOUNTER — NURSE ONLY (OUTPATIENT)
Dept: FAMILY MEDICINE CLINIC | Age: 42
End: 2022-01-28
Payer: MEDICAID

## 2022-01-28 ENCOUNTER — HOSPITAL ENCOUNTER (OUTPATIENT)
Age: 42
Setting detail: SPECIMEN
Discharge: HOME | End: 2022-01-28
Payer: MEDICAID

## 2022-01-28 DIAGNOSIS — Z20.822 CLOSE EXPOSURE TO COVID-19 VIRUS: Primary | ICD-10-CM

## 2022-01-28 LAB
SARS-COV-2: NOT DETECTED
SOURCE: NORMAL

## 2022-01-28 PROCEDURE — U0005 INFEC AGEN DETEC AMPLI PROBE: HCPCS

## 2022-01-28 PROCEDURE — 99211 OFF/OP EST MAY X REQ PHY/QHP: CPT | Performed by: FAMILY MEDICINE

## 2022-01-28 PROCEDURE — U0003 INFECTIOUS AGENT DETECTION BY NUCLEIC ACID (DNA OR RNA); SEVERE ACUTE RESPIRATORY SYNDROME CORONAVIRUS 2 (SARS-COV-2) (CORONAVIRUS DISEASE [COVID-19]), AMPLIFIED PROBE TECHNIQUE, MAKING USE OF HIGH THROUGHPUT TECHNOLOGIES AS DESCRIBED BY CMS-2020-01-R: HCPCS

## 2022-02-21 ENCOUNTER — OFFICE VISIT (OUTPATIENT)
Dept: FAMILY MEDICINE CLINIC | Age: 42
End: 2022-02-21
Payer: MEDICAID

## 2022-02-21 VITALS
WEIGHT: 138 LBS | SYSTOLIC BLOOD PRESSURE: 118 MMHG | HEART RATE: 96 BPM | DIASTOLIC BLOOD PRESSURE: 78 MMHG | HEIGHT: 65 IN | BODY MASS INDEX: 22.99 KG/M2

## 2022-02-21 DIAGNOSIS — N88.9 CERVIX ABNORMALITY: ICD-10-CM

## 2022-02-21 DIAGNOSIS — Z01.419 WELL FEMALE EXAM WITH ROUTINE GYNECOLOGICAL EXAM: Primary | ICD-10-CM

## 2022-02-21 DIAGNOSIS — Z86.16 HISTORY OF 2019 NOVEL CORONAVIRUS DISEASE (COVID-19): ICD-10-CM

## 2022-02-21 DIAGNOSIS — Z72.0 TOBACCO ABUSE: ICD-10-CM

## 2022-02-21 DIAGNOSIS — N94.6 DYSMENORRHEA: ICD-10-CM

## 2022-02-21 DIAGNOSIS — F41.8 SITUATIONAL ANXIETY: ICD-10-CM

## 2022-02-21 DIAGNOSIS — Z11.59 NEED FOR HEPATITIS C SCREENING TEST: ICD-10-CM

## 2022-02-21 DIAGNOSIS — Z23 NEED FOR COVID-19 VACCINE: ICD-10-CM

## 2022-02-21 LAB
CHOLESTEROL, TOTAL: 189 MG/DL (ref 0–199)
HDLC SERPL-MCNC: 61 MG/DL (ref 40–60)
HEPATITIS C ANTIBODY INTERPRETATION: NORMAL
LDL CHOLESTEROL CALCULATED: 108 MG/DL
TRIGL SERPL-MCNC: 98 MG/DL (ref 0–150)
VLDLC SERPL CALC-MCNC: 20 MG/DL

## 2022-02-21 PROCEDURE — G8484 FLU IMMUNIZE NO ADMIN: HCPCS | Performed by: FAMILY MEDICINE

## 2022-02-21 PROCEDURE — G8427 DOCREV CUR MEDS BY ELIG CLIN: HCPCS | Performed by: FAMILY MEDICINE

## 2022-02-21 PROCEDURE — 99213 OFFICE O/P EST LOW 20 MIN: CPT | Performed by: FAMILY MEDICINE

## 2022-02-21 PROCEDURE — G8420 CALC BMI NORM PARAMETERS: HCPCS | Performed by: FAMILY MEDICINE

## 2022-02-21 PROCEDURE — 36415 COLL VENOUS BLD VENIPUNCTURE: CPT | Performed by: FAMILY MEDICINE

## 2022-02-21 PROCEDURE — 99396 PREV VISIT EST AGE 40-64: CPT | Performed by: FAMILY MEDICINE

## 2022-02-21 PROCEDURE — 4004F PT TOBACCO SCREEN RCVD TLK: CPT | Performed by: FAMILY MEDICINE

## 2022-02-21 RX ORDER — OMEPRAZOLE 40 MG/1
40 CAPSULE, DELAYED RELEASE ORAL DAILY
Qty: 90 CAPSULE | Refills: 3 | Status: SHIPPED | OUTPATIENT
Start: 2022-02-21

## 2022-02-21 RX ORDER — IBUPROFEN 800 MG/1
800 TABLET ORAL EVERY 8 HOURS PRN
Qty: 60 TABLET | Refills: 1 | Status: SHIPPED | OUTPATIENT
Start: 2022-02-21

## 2022-02-21 RX ORDER — LORAZEPAM 0.5 MG/1
0.5 TABLET ORAL DAILY PRN
Qty: 15 TABLET | Refills: 1 | Status: SHIPPED | OUTPATIENT
Start: 2022-02-21 | End: 2022-05-22

## 2022-02-21 ASSESSMENT — ANXIETY QUESTIONNAIRES
2. NOT BEING ABLE TO STOP OR CONTROL WORRYING: 1
6. BECOMING EASILY ANNOYED OR IRRITABLE: 1
GAD7 TOTAL SCORE: 12
1. FEELING NERVOUS, ANXIOUS, OR ON EDGE: 1
7. FEELING AFRAID AS IF SOMETHING AWFUL MIGHT HAPPEN: 3
IF YOU CHECKED OFF ANY PROBLEMS ON THIS QUESTIONNAIRE, HOW DIFFICULT HAVE THESE PROBLEMS MADE IT FOR YOU TO DO YOUR WORK, TAKE CARE OF THINGS AT HOME, OR GET ALONG WITH OTHER PEOPLE: SOMEWHAT DIFFICULT
4. TROUBLE RELAXING: 1
5. BEING SO RESTLESS THAT IT IS HARD TO SIT STILL: 3
3. WORRYING TOO MUCH ABOUT DIFFERENT THINGS: 2

## 2022-02-21 NOTE — PATIENT INSTRUCTIONS
PLEASE BRING YOUR MEDICATIONS TO ALL APPOINTMENTS      Please check MY CHART for test results. If you have forgotten your password, please call [phone]. The diagnoses and medications listed in this after visit summary may not be up to date. Please check MY CHART in 28-48 hours for possible corrections. Late cancellation policy: So that we can better accommodate people who are sick, please give our office 24 hour notice for an appointment cancellation. Missed appointments: Your care is very important to us. It is important that you keep your scheduled appointments. Multiple missed appointments will lead to a dismissal from the office. Patients arriving late will be worked into the schedule as time permits, with patients arriving on time taken as scheduled. Late arriving patients are more than welcome to wait or reschedule their appointments. Please allow 5-7 business days for paperwork to be processed. HERE ARE SOME LIFE CHANGING TIPS      1. Take your blood pressure medications at bedtime to reduce your chance of heart attack or stroke  2. Fever in kids:  Give both Tylenol and Ibuprofen at the same time rather than staggering them   3. Follow these tips to reduce childhood obesity: Reduce unnecessary exposure to antibiotics, consume whole milk instead of skim milk, watch public TV instead of regular TV (less exposure to junk food commercials), and reduce traumatic experiences. 4. 1 egg per day is good for your heart  5. Alternate day fasting does promote weight loss. Skipping breakfast increases your risk of obesity  6. Artificially sweetened drinks increase all cause mortality (strokes, body mass index, cardiovascular disease)  7. Kale consumption can reduce onset of dementia  8. Walking at least 8000 steps per day and resistance exercise 2-3 x per week are good for your heart  9. Brushing teeth 3 times per day can decrease chance of getting diabetes  10.  Antibiotic use is associated with a lifetime increased risk of breast cancer and heart disease.

## 2022-02-21 NOTE — PROGRESS NOTES
Roxanna Ibarra  YOB: 1980    Date of Service:  2/21/2022    Chief Complaint:   Ifeanyi Paz is a 39 y.o. female who presents for pap smear  HPI: Here for pap    MELITON 7 SCORE 2/21/2022   MELITON-7 Total Score 12     Interpretation of MELITON-7 score: 5-9 = mild anxiety, 10-14 = moderate anxiety, 15+ = severe anxiety. Recommend referral to behavioral health for scores 10 or greater. Hx abnormal PAP: no but was told that her cervix was severely scarred. Had procedure done (biopsy) that was very painful. Never went back. No pain with intercourse. Some vaginal bleeding after \"rough sex\" 3-4 times per year. Menstrual cramping, especially for the first 2 days. Takes 600 mg ibuprofen. Not interested in OCP (smokes)  Sexual activity: single partner, contraception - vasectomy     Anxiety: takes Ativan a few times per month when anxious. Does not want Prozac. Stress at work.     GERD: still needing prilosec    Exercise: occasional  Seatbelt use:No  Tobacco: 1 PPD       Wt Readings from Last 3 Encounters:   02/21/22 138 lb (62.6 kg)   01/27/21 135 lb 9.6 oz (61.5 kg)   09/08/20 137 lb (62.1 kg)     BP Readings from Last 3 Encounters:   02/21/22 118/78   05/08/21 138/72   01/27/21 124/80       Patient Active Problem List   Diagnosis    Gastroesophageal reflux disease without esophagitis    Bone spur on posterior portion of calcaneus    Anxiety    History of colon polyps       Preventive Care:  Health Maintenance   Topic Date Due    COVID-19 Vaccine (1) Never done    Cervical cancer screen  Never done    Flu vaccine (1) Never done    Depression Monitoring  04/26/2022    Colorectal Cancer Screen  05/10/2026    Lipid screen  02/21/2027    DTaP/Tdap/Td vaccine (2 - Td or Tdap) 10/23/2027    Pneumococcal 0-64 years Vaccine (2 of 2 - PPSV23) 03/18/2045    Hepatitis C screen  Completed    Hepatitis A vaccine  Aged Out    Hepatitis B vaccine  Aged Out    Hib vaccine  Aged C/ Lyle Keyanna 19 Meningococcal (ACWY) vaccine  Aged Out    Varicella vaccine  Discontinued    HIV screen  Discontinued        Immunization History   Administered Date(s) Administered    Pneumococcal Polysaccharide ([id number]) 10/23/2017    Tdap (Boostrix, Adacel) 10/23/2017       Allergies   Allergen Reactions    Sulfa Antibiotics Itching     Outpatient Medications Marked as Taking for the 2/21/22 encounter (Office Visit) with Leslee Thomas MD   Medication Sig Dispense Refill    omeprazole (PRILOSEC) 40 MG delayed release capsule Take 1 capsule by mouth daily 90 capsule 3    ibuprofen (ADVIL;MOTRIN) 800 MG tablet Take 1 tablet by mouth every 8 hours as needed for Pain 60 tablet 1    LORazepam (ATIVAN) 0.5 MG tablet Take 1 tablet by mouth daily as needed for Anxiety for up to 90 days.  Needs to last 90 days 15 tablet 1       Past Medical History:   Diagnosis Date    Anxiety     Bone spur 04/2017    left heel of foot    Depression     Diverticulosis     IBS (irritable bowel syndrome)     Menorrhagia     Migraine     Plantar fasciitis      Past Surgical History:   Procedure Laterality Date    DILATION AND CURETTAGE OF UTERUS  02/2017     Family History   Problem Relation Age of Onset    No Known Problems Mother     No Known Problems Father     No Known Problems Sister     No Known Problems Brother      Social History     Socioeconomic History    Marital status: Single     Spouse name: Not on file    Number of children: 3    Years of education: Not on file    Highest education level: Not on file   Occupational History    Occupation:    Tobacco Use    Smoking status: Current Every Day Smoker     Packs/day: 1.00     Types: Cigarettes     Start date: 1997    Smokeless tobacco: Never Used   Substance and Sexual Activity    Alcohol use: Yes     Comment: soc    Drug use: No    Sexual activity: Yes     Partners: Male   Other Topics Concern    Not on file   Social History Narrative    Not on file     Social laceration, mucosal edema or rhinorrhea. Right Sinus: No maxillary sinus tenderness or frontal sinus tenderness. Left Sinus: No maxillary sinus tenderness or frontal sinus tenderness. Mouth/Throat:      Mouth: Mucous membranes are moist.      Pharynx: No oropharyngeal exudate or posterior oropharyngeal erythema. Eyes:      Conjunctiva/sclera: Conjunctivae normal.      Pupils: Pupils are equal, round, and reactive to light. Neck:      Thyroid: No thyromegaly. Trachea: No tracheal deviation. Cardiovascular:      Rate and Rhythm: Normal rate and regular rhythm. Heart sounds: Normal heart sounds, S1 normal and S2 normal. No friction rub. No gallop. Pulmonary:      Effort: Pulmonary effort is normal. No respiratory distress. Breath sounds: Normal breath sounds. No wheezing or rales. Chest:   Breasts: Breasts are symmetrical.      Right: No inverted nipple, mass, nipple discharge, skin change or tenderness. Left: No inverted nipple, mass, nipple discharge, skin change or tenderness. Abdominal:      General: There is no distension. Palpations: Abdomen is soft. There is no hepatomegaly, splenomegaly or mass. Tenderness: There is no abdominal tenderness. Genitourinary:     General: Normal vulva. Vagina: Normal.      Cervix: Friability present. Uterus: Normal.       Adnexa: Right adnexa normal and left adnexa normal.            Comments: Cervix is bumpy  Musculoskeletal:      Cervical back: Neck supple. Right lower leg: No edema. Left lower leg: No edema. Lymphadenopathy:      Head:      Right side of head: No submental, submandibular or posterior auricular adenopathy. Left side of head: No submental, submandibular or posterior auricular adenopathy. Cervical:      Right cervical: No superficial, deep or posterior cervical adenopathy. Left cervical: No superficial, deep or posterior cervical adenopathy.    Skin:     General: Skin is warm and dry. Coloration: Skin is not jaundiced. Neurological:      General: No focal deficit present. Mental Status: She is alert and oriented to person, place, and time. Mental status is at baseline. Cranial Nerves: Cranial nerves are intact. Sensory: Sensation is intact. Motor: Motor function is intact. Gait: Gait is intact. Deep Tendon Reflexes:      Reflex Scores:       Patellar reflexes are 2+ on the right side and 2+ on the left side. Psychiatric:         Attention and Perception: She is attentive. Speech: Speech normal.         Behavior: Behavior normal.         Thought Content: Thought content normal.         Judgment: Judgment normal.          Lab Review: not applicable     Assessment/Plan:    Linh Lao was seen today for gynecologic exam and anxiety. Diagnoses and all orders for this visit:    Well female exam with routine gynecological exam  -     PAP SMEAR  -     Lipid Panel  -     PAP SMEAR    Need for COVID-19 vaccine    History of 2019 novel coronavirus disease (COVID-19)    Tobacco abuse    Dysmenorrhea  -     ibuprofen (ADVIL;MOTRIN) 800 MG tablet; Take 1 tablet by mouth every 8 hours as needed for Pain    Situational anxiety  -     LORazepam (ATIVAN) 0.5 MG tablet; Take 1 tablet by mouth daily as needed for Anxiety for up to 90 days. Needs to last 90 days    Cervix abnormality  -     US NON OB TRANSVAGINAL  -     PAP SMEAR    Need for hepatitis C screening test  -     Hepatitis C Antibody    Other orders  -     omeprazole (PRILOSEC) 40 MG delayed release capsule; Take 1 capsule by mouth daily        . Goal of 5 vegetables and fruits per day or half the plate be vegetables and fruits. 2-3 serving of dairy per day. Encourage aerobic exercise up to 150 minutes per week. Mammogram guidelines per USPTF recommendations. Mammogram is not ordered. Patient is considered to be low for breast cancer.     Patient does not need low dose aspirin to lower her ASCVD risk

## 2022-02-21 NOTE — Clinical Note
Let lab know that I had to change the pap smear order because there were visible lesions on the cervix. I'm unable to cancel the original order. New order placed.

## 2022-04-22 ENCOUNTER — APPOINTMENT (OUTPATIENT)
Dept: GENERAL RADIOLOGY | Age: 42
End: 2022-04-22
Payer: MEDICAID

## 2022-04-22 ENCOUNTER — HOSPITAL ENCOUNTER (EMERGENCY)
Age: 42
Discharge: HOME | End: 2022-04-22
Attending: EMERGENCY MEDICINE
Payer: MEDICAID

## 2022-04-22 ENCOUNTER — APPOINTMENT (OUTPATIENT)
Dept: CT IMAGING | Age: 42
End: 2022-04-22
Payer: MEDICAID

## 2022-04-22 VITALS
WEIGHT: 135 LBS | OXYGEN SATURATION: 98 % | HEART RATE: 76 BPM | RESPIRATION RATE: 12 BRPM | HEIGHT: 66 IN | TEMPERATURE: 98.5 F | BODY MASS INDEX: 21.69 KG/M2 | DIASTOLIC BLOOD PRESSURE: 66 MMHG | SYSTOLIC BLOOD PRESSURE: 101 MMHG

## 2022-04-22 DIAGNOSIS — R51.9 ACUTE NONINTRACTABLE HEADACHE, UNSPECIFIED HEADACHE TYPE: Primary | ICD-10-CM

## 2022-04-22 DIAGNOSIS — R10.9 ABDOMINAL PAIN, UNSPECIFIED ABDOMINAL LOCATION: ICD-10-CM

## 2022-04-22 DIAGNOSIS — R07.9 CHEST PAIN, UNSPECIFIED TYPE: ICD-10-CM

## 2022-04-22 LAB
ALBUMIN SERPL-MCNC: 4.4 GM/DL (ref 3.4–5)
ALP BLD-CCNC: 85 IU/L (ref 40–129)
ALT SERPL-CCNC: 13 U/L (ref 10–40)
ANION GAP SERPL CALCULATED.3IONS-SCNC: 13 MMOL/L (ref 4–16)
AST SERPL-CCNC: 18 IU/L (ref 15–37)
BACTERIA: NEGATIVE /HPF
BASOPHILS ABSOLUTE: 0.1 K/CU MM
BASOPHILS RELATIVE PERCENT: 0.8 % (ref 0–1)
BILIRUB SERPL-MCNC: 0.5 MG/DL (ref 0–1)
BILIRUBIN URINE: NEGATIVE MG/DL
BLOOD, URINE: ABNORMAL
BUN BLDV-MCNC: 8 MG/DL (ref 6–23)
CALCIUM SERPL-MCNC: 9.1 MG/DL (ref 8.3–10.6)
CHLORIDE BLD-SCNC: 102 MMOL/L (ref 99–110)
CLARITY: CLEAR
CO2: 25 MMOL/L (ref 21–32)
COLOR: YELLOW
CREAT SERPL-MCNC: 0.6 MG/DL (ref 0.6–1.1)
DIFFERENTIAL TYPE: ABNORMAL
EOSINOPHILS ABSOLUTE: 0.2 K/CU MM
EOSINOPHILS RELATIVE PERCENT: 2.2 % (ref 0–3)
GFR AFRICAN AMERICAN: >60 ML/MIN/1.73M2
GFR NON-AFRICAN AMERICAN: >60 ML/MIN/1.73M2
GLUCOSE BLD-MCNC: 75 MG/DL (ref 70–99)
GLUCOSE, URINE: NEGATIVE MG/DL
HCG QUALITATIVE: NEGATIVE
HCT VFR BLD CALC: 47 % (ref 37–47)
HEMOGLOBIN: 15.4 GM/DL (ref 12.5–16)
IMMATURE NEUTROPHIL %: 0.3 % (ref 0–0.43)
KETONES, URINE: 15 MG/DL
LEUKOCYTE ESTERASE, URINE: ABNORMAL
LIPASE: 19 IU/L (ref 13–60)
LYMPHOCYTES ABSOLUTE: 2.1 K/CU MM
LYMPHOCYTES RELATIVE PERCENT: 28.7 % (ref 24–44)
MCH RBC QN AUTO: 32.6 PG (ref 27–31)
MCHC RBC AUTO-ENTMCNC: 32.8 % (ref 32–36)
MCV RBC AUTO: 99.6 FL (ref 78–100)
MONOCYTES ABSOLUTE: 0.5 K/CU MM
MONOCYTES RELATIVE PERCENT: 6.9 % (ref 0–4)
MUCUS: ABNORMAL HPF
NITRITE URINE, QUANTITATIVE: NEGATIVE
NUCLEATED RBC %: 0 %
PDW BLD-RTO: 12.6 % (ref 11.7–14.9)
PH, URINE: 6.5 (ref 5–8)
PLATELET # BLD: 241 K/CU MM (ref 140–440)
PMV BLD AUTO: 10.9 FL (ref 7.5–11.1)
POTASSIUM SERPL-SCNC: 4 MMOL/L (ref 3.5–5.1)
PROTEIN UA: NEGATIVE MG/DL
RAPID INFLUENZA  B AGN: NEGATIVE
RAPID INFLUENZA A AGN: NEGATIVE
RBC # BLD: 4.72 M/CU MM (ref 4.2–5.4)
RBC URINE: 5 /HPF (ref 0–6)
SARS-COV-2, NAAT: NOT DETECTED
SEGMENTED NEUTROPHILS ABSOLUTE COUNT: 4.4 K/CU MM
SEGMENTED NEUTROPHILS RELATIVE PERCENT: 61.1 % (ref 36–66)
SODIUM BLD-SCNC: 140 MMOL/L (ref 135–145)
SOURCE: NORMAL
SPECIFIC GRAVITY UA: 1.02 (ref 1–1.03)
SQUAMOUS EPITHELIAL: 13 /HPF
TOTAL CK: 55 IU/L (ref 26–140)
TOTAL IMMATURE NEUTOROPHIL: 0.02 K/CU MM
TOTAL NUCLEATED RBC: 0 K/CU MM
TOTAL PROTEIN: 6.9 GM/DL (ref 6.4–8.2)
TRICHOMONAS: ABNORMAL /HPF
TROPONIN T: <0.01 NG/ML
TROPONIN T: <0.01 NG/ML
UROBILINOGEN, URINE: NORMAL MG/DL (ref 0.2–1)
WBC # BLD: 7.3 K/CU MM (ref 4–10.5)
WBC UA: 5 /HPF (ref 0–5)

## 2022-04-22 PROCEDURE — 82550 ASSAY OF CK (CPK): CPT

## 2022-04-22 PROCEDURE — 99285 EMERGENCY DEPT VISIT HI MDM: CPT

## 2022-04-22 PROCEDURE — 83690 ASSAY OF LIPASE: CPT

## 2022-04-22 PROCEDURE — 87635 SARS-COV-2 COVID-19 AMP PRB: CPT

## 2022-04-22 PROCEDURE — 6360000004 HC RX CONTRAST MEDICATION: Performed by: PHYSICIAN ASSISTANT

## 2022-04-22 PROCEDURE — 6370000000 HC RX 637 (ALT 250 FOR IP): Performed by: PHYSICIAN ASSISTANT

## 2022-04-22 PROCEDURE — 81001 URINALYSIS AUTO W/SCOPE: CPT

## 2022-04-22 PROCEDURE — 84484 ASSAY OF TROPONIN QUANT: CPT

## 2022-04-22 PROCEDURE — 80053 COMPREHEN METABOLIC PANEL: CPT

## 2022-04-22 PROCEDURE — 2580000003 HC RX 258: Performed by: PHYSICIAN ASSISTANT

## 2022-04-22 PROCEDURE — 96375 TX/PRO/DX INJ NEW DRUG ADDON: CPT

## 2022-04-22 PROCEDURE — 84703 CHORIONIC GONADOTROPIN ASSAY: CPT

## 2022-04-22 PROCEDURE — 93005 ELECTROCARDIOGRAM TRACING: CPT | Performed by: EMERGENCY MEDICINE

## 2022-04-22 PROCEDURE — 74177 CT ABD & PELVIS W/CONTRAST: CPT

## 2022-04-22 PROCEDURE — 96374 THER/PROPH/DIAG INJ IV PUSH: CPT

## 2022-04-22 PROCEDURE — 71045 X-RAY EXAM CHEST 1 VIEW: CPT

## 2022-04-22 PROCEDURE — 87804 INFLUENZA ASSAY W/OPTIC: CPT

## 2022-04-22 PROCEDURE — 85025 COMPLETE CBC W/AUTO DIFF WBC: CPT

## 2022-04-22 PROCEDURE — 6360000002 HC RX W HCPCS: Performed by: PHYSICIAN ASSISTANT

## 2022-04-22 PROCEDURE — A4216 STERILE WATER/SALINE, 10 ML: HCPCS | Performed by: PHYSICIAN ASSISTANT

## 2022-04-22 RX ORDER — ONDANSETRON 4 MG/1
4 TABLET, ORALLY DISINTEGRATING ORAL EVERY 6 HOURS
Qty: 20 TABLET | Refills: 0 | Status: SHIPPED | OUTPATIENT
Start: 2022-04-22

## 2022-04-22 RX ORDER — SODIUM CHLORIDE 0.9 % (FLUSH) 0.9 %
5-40 SYRINGE (ML) INJECTION 2 TIMES DAILY
Status: DISCONTINUED | OUTPATIENT
Start: 2022-04-22 | End: 2022-04-22 | Stop reason: HOSPADM

## 2022-04-22 RX ORDER — DIPHENHYDRAMINE HYDROCHLORIDE 50 MG/ML
25 INJECTION INTRAMUSCULAR; INTRAVENOUS ONCE
Status: COMPLETED | OUTPATIENT
Start: 2022-04-22 | End: 2022-04-22

## 2022-04-22 RX ORDER — DICYCLOMINE HYDROCHLORIDE 10 MG/1
20 CAPSULE ORAL
Qty: 30 CAPSULE | Refills: 0 | Status: SHIPPED | OUTPATIENT
Start: 2022-04-22

## 2022-04-22 RX ORDER — ACETAMINOPHEN 500 MG
1000 TABLET ORAL ONCE
Status: COMPLETED | OUTPATIENT
Start: 2022-04-22 | End: 2022-04-22

## 2022-04-22 RX ORDER — PROCHLORPERAZINE EDISYLATE 5 MG/ML
10 INJECTION INTRAMUSCULAR; INTRAVENOUS ONCE
Status: COMPLETED | OUTPATIENT
Start: 2022-04-22 | End: 2022-04-22

## 2022-04-22 RX ORDER — 0.9 % SODIUM CHLORIDE 0.9 %
1000 INTRAVENOUS SOLUTION INTRAVENOUS ONCE
Status: COMPLETED | OUTPATIENT
Start: 2022-04-22 | End: 2022-04-22

## 2022-04-22 RX ADMIN — DIPHENHYDRAMINE HYDROCHLORIDE 25 MG: 50 INJECTION, SOLUTION INTRAMUSCULAR; INTRAVENOUS at 17:11

## 2022-04-22 RX ADMIN — IOPAMIDOL 75 ML: 755 INJECTION, SOLUTION INTRAVENOUS at 19:28

## 2022-04-22 RX ADMIN — SODIUM CHLORIDE, PRESERVATIVE FREE 10 ML: 5 INJECTION INTRAVENOUS at 20:10

## 2022-04-22 RX ADMIN — PROCHLORPERAZINE EDISYLATE 10 MG: 5 INJECTION INTRAMUSCULAR; INTRAVENOUS at 17:11

## 2022-04-22 RX ADMIN — ACETAMINOPHEN 1000 MG: 500 TABLET ORAL at 17:11

## 2022-04-22 RX ADMIN — SODIUM CHLORIDE 1000 ML: 9 INJECTION, SOLUTION INTRAVENOUS at 17:10

## 2022-04-22 ASSESSMENT — ENCOUNTER SYMPTOMS
ABDOMINAL PAIN: 1
NAUSEA: 1
SHORTNESS OF BREATH: 0
TROUBLE SWALLOWING: 0
DIARRHEA: 0
COUGH: 0
VOMITING: 1
BACK PAIN: 0
SORE THROAT: 1

## 2022-04-22 ASSESSMENT — PAIN DESCRIPTION - LOCATION: LOCATION: ABDOMEN;HEAD;CHEST

## 2022-04-22 ASSESSMENT — HEART SCORE: ECG: 0

## 2022-04-22 ASSESSMENT — PAIN SCALES - GENERAL: PAINLEVEL_OUTOF10: 8

## 2022-04-22 NOTE — ED PROVIDER NOTES
[address] ENCOUNTER        Pt Name: Choco Bermudez  MRN: [id number]  [place] 1980  Date of evaluation: 4/22/2022  Provider: Hortencia Lala PA-C  PCP: Jaime Aranda MD  Note Started: 4:46 PM EDT      SANDRA. I have evaluated this patient. My supervising physician was available for consultation. Triage CHIEF COMPLAINT       Chief Complaint   Patient presents with    Migraine    Abdominal Pain    Chest Pain         HISTORY OF PRESENT ILLNESS   (Location/Symptom, Timing/Onset, Context/Setting, Quality, Duration, Modifying Factors, Severity)  Note limiting factors. Chief Complaint: HA, N/V, CP    Choco Bermudez is a 43 y.o. female who presents complaint of headache, that she states started yesterday, mentioning that it was related to stress. She describes gradual onset persistent today. Is accompanied with some nausea and she does describe several episodes of vomiting which he states is making her abdominal pain . She mentions she developed some chest pain and pain in her arm which prompted concern from her boyfriend and she was encouraged to come here today for evaluation. She took some Excedrin headache, with minimal improvement. She does describe some generalized myalgias. She states he has had some chills yesterday, but otherwise denies any fever, neck pain, hemoptysis, cough, sore throat, urinary symptoms, flank pain, sick contacts    Nursing Notes were all reviewed and agreed with or any disagreements were addressed in the HPI. REVIEW OF SYSTEMS    (2-9 systems for level 4, 10 or more for level 5)     Review of Systems   Constitutional: Positive for chills. Negative for fever. HENT: Positive for sore throat. Negative for trouble swallowing. Respiratory: Negative for cough and shortness of breath. Cardiovascular: Positive for chest pain.    Gastrointestinal: Positive for abdominal pain, nausea and vomiting. Negative for diarrhea (chronic (but not today)). Endocrine: Negative for polyuria. Genitourinary: Negative for dysuria. Musculoskeletal: Negative for back pain. Neurological: Negative for dizziness, seizures and numbness. Psychiatric/Behavioral: Negative for confusion. PAST MEDICAL HISTORY     Past Medical History:   Diagnosis Date    Anxiety     Bone spur 04/2017    left heel of foot    Depression     Diverticulosis     IBS (irritable bowel syndrome)     Menorrhagia     Migraine     Plantar fasciitis        SURGICAL HISTORY     Past Surgical History:   Procedure Laterality Date    DILATION AND CURETTAGE OF UTERUS  02/2017       CURRENTMEDICATIONS       Previous Medications    ALBUTEROL SULFATE HFA (PROVENTIL HFA) 108 (90 BASE) MCG/ACT INHALER    Inhale 2 puffs into the lungs every 4 hours as needed for Wheezing or Shortness of Breath With spacer (and mask if indicated). Thanks. IBUPROFEN (ADVIL;MOTRIN) 800 MG TABLET    Take 1 tablet by mouth every 8 hours as needed for Pain    LORAZEPAM (ATIVAN) 0.5 MG TABLET    Take 1 tablet by mouth daily as needed for Anxiety for up to 90 days.  Needs to last 90 days    OMEPRAZOLE (PRILOSEC) 40 MG DELAYED RELEASE CAPSULE    Take 1 capsule by mouth daily    SPACER/AERO-HOLDING CHAMBERS (E-Z SPACER) JUAQUIN    1 Device by Does not apply route daily as needed (for use with inhaler for wheezing)       ALLERGIES     Sulfa antibiotics    FAMILYHISTORY       Family History   Problem Relation Age of Onset    No Known Problems Mother     No Known Problems Father     No Known Problems Sister     No Known Problems Brother         SOCIAL HISTORY       Social History     Socioeconomic History    Marital status: Single     Spouse name: None    Number of children: 3    Years of education: None    Highest education level: None   Occupational History    Occupation:    Tobacco Use    Smoking status: Current Every Day Smoker Packs/day: 1.00     Types: Cigarettes     Start date: 0    Smokeless tobacco: Never Used   Substance and Sexual Activity    Alcohol use: Yes     Comment: soc    Drug use: No    Sexual activity: Yes     Partners: Male   Other Topics Concern    None   Social History Narrative    None     Social Determinants of Health     Financial Resource Strain:     Difficulty of Paying Living Expenses: Not on file   Food Insecurity:     Worried About Running Out of Food in the Last Year: Not on file    Chrystal of Food in the Last Year: Not on file   Transportation Needs:     Lack of Transportation (Medical): Not on file    Lack of Transportation (Non-Medical):  Not on file   Physical Activity:     Days of Exercise per Week: Not on file    Minutes of Exercise per Session: Not on file   Stress:     Feeling of Stress : Not on file   Social Connections:     Frequency of Communication with Friends and Family: Not on file    Frequency of Social Gatherings with Friends and Family: Not on file    Attends Taoism Services: Not on file    Active Member of [address] [place] [address] or Organizations: Not on file    Attends Club or Organization Meetings: Not on file    Marital Status: Not on file   Intimate Partner Violence:     Fear of Current or Ex-Partner: Not on file    Emotionally Abused: Not on file    Physically Abused: Not on file    Sexually Abused: Not on file   Housing Stability:     Unable to Pay for Housing in the Last Year: Not on file    Number of [place] in the Last Year: Not on file    Unstable Housing in the Last Year: Not on file       SCREENINGS    Estrella Coma Scale  Eye Opening: Spontaneous  Best Verbal Response: Oriented  Best Motor Response: Obeys commands  Estrella Coma Scale Score: 15 Heart Score for chest pain patients  History: Slightly Suspicious  ECG: Normal  Patient Age: < 45 years  *Risk factors for Atherosclerotic disease: Cigarette smoking  Risk Factors: 1 or 2 risk factors  Troponin: < 1X normal limit  Heart Score Total: 1      PHYSICAL EXAM    (up to 7 for level 4, 8 or more for level 5)     ED Triage Vitals [04/22/22 1542]   BP Temp Temp Source Pulse Resp SpO2 Height Weight   128/79 97.9 °F (36.6 °C) Oral 106 14 98 % 5' 6\" (1.676 m) 135 lb (61.2 kg)       Physical Exam  Vitals and nursing note reviewed. Constitutional:       Appearance: Normal appearance. She is well-developed. She is not ill-appearing or diaphoretic. HENT:      Head: Normocephalic and atraumatic. Eyes:      General: No scleral icterus. Right eye: No discharge. Left eye: No discharge. Cardiovascular:      Rate and Rhythm: Normal rate and regular rhythm. Heart sounds: Normal heart sounds. No murmur heard. No friction rub. No gallop. Pulmonary:      Effort: Pulmonary effort is normal. No respiratory distress. Breath sounds: Normal breath sounds. No stridor. No wheezing or rales. Chest:      Chest wall: No tenderness. Abdominal:      General: Bowel sounds are normal. There is no distension. Palpations: Abdomen is soft. There is no mass. Tenderness: There is no abdominal tenderness. There is no guarding or rebound. Musculoskeletal:         General: No tenderness. Normal range of motion. Cervical back: Normal range of motion and neck supple. Skin:     General: Skin is warm and dry. Coloration: Skin is not pale. Neurological:      Mental Status: She is alert and oriented to person, place, and time.       Coordination: Coordination normal.   Psychiatric:         Mood and Affect: Mood normal.         Behavior: Behavior normal.         DIAGNOSTIC RESULTS   LABS:    Labs Reviewed   CBC WITH AUTO DIFFERENTIAL - Abnormal; Notable for the following components:       Result Value    MCH 32.6 (*)     Monocytes % 6.9 (*)     All other components within normal limits   URINALYSIS WITH REFLEX TO CULTURE - Abnormal; Notable for the following components:    Ketones, Urine 15 (*)     Blood, Urine MODERATE (*)     Leukocyte Esterase, Urine SMALL (*)     Mucus, UA RARE (*)     All other components within normal limits   COVID-19, RAPID   RAPID INFLUENZA A/B ANTIGENS   COMPREHENSIVE METABOLIC PANEL   TROPONIN   LIPASE   HCG, SERUM, QUALITATIVE   CK   TROPONIN       When ordered, only abnormal lab results are displayed. All other labs were within normal range or not returned as of this dictation. EKG: When ordered, EKG's are interpreted by the Emergency Department Physician in the absence of a cardiologist.  Please see their note for interpretation of EKG. RADIOLOGY:   Non-plain film images such as CT, Ultrasound and MRI are read by the radiologist. Plain radiographic images are visualized andpreliminarily interpreted by the  ED Provider with the below findings:        Interpretation perthe Radiologist below, if available at the time of this note:    CT ABDOMEN PELVIS W IV CONTRAST Additional Contrast? None    (Results Pending)   XR CHEST PORTABLE    (Results Pending)     No results found. PROCEDURES   Unless otherwise noted below, none     Procedures    CRITICAL CARE TIME   N/A    CONSULTS:  None      EMERGENCY DEPARTMENT COURSE and DIFFERENTIAL DIAGNOSIS/MDM:   Vitals:    Vitals:    04/22/22 1542 04/22/22 1648 04/22/22 1712   BP: 128/79  (!) 130/53   Pulse: 106  79   Resp: 14  15   Temp: 97.9 °F (36.6 °C) 98.5 °F (36.9 °C)    TempSrc: Oral     SpO2: 98%  100%   Weight: 135 lb (61.2 kg)     Height: 5' 6\" (1.676 m)         Patient was given thefollowing medications:  Medications   0.9 % sodium chloride bolus (1,000 mLs IntraVENous New Bag 4/22/22 1710)   diphenhydrAMINE (BENADRYL) injection 25 mg (25 mg IntraVENous Given 4/22/22 1711)   prochlorperazine (COMPAZINE) injection 10 mg (10 mg IntraVENous Given 4/22/22 1711)   acetaminophen (TYLENOL) tablet 1,000 mg (1,000 mg Oral Given 4/22/22 1711)           51-year-old female with a bit of HPI.   She does report a history of previous headaches compares her current headache with similar episodes. It was gradual in onset. No sick contacts. No injury or trauma or recent illness. On examination she appears uncomfortable but is alert and oriented no acute distress does not look toxic. No meningeal signs. Diffuse mild abdominal pain no focal peritoneal signs or CVA tenderness. Lung sounds are clear. She is describing some generalized myalgias, headaches, sore throat, potential for viral etiology. We will plan lab work, flu/COVID swabs, headache cocktail reassessment. @1815patient did receive headache cocktail, she states her headache has improved. Nausea has improved, however she is continuing to have some abdominal pain. Have ordered a CT scan abdomen pelvis. Additionally further discussion with her, she does describe onset of chest pain at that had started just prior to arrival.  Her normal troponin is negative. We will plan for repeat troponin. HEART score 1. She was initially mildly tachycardic upon arrival digitally due to her pain, and recent vomiting. Reassessment has improved. This time, given improvement in her symptoms, no risk factors for PE or DVT no concern for PE., SAH, ICH, cranial mass, CNS infection, meningitis, Likely viral syndrome, migraine    @1830 At this time it is the end of my shift. Care be transferred over to night shift provider Az Swanson PA-C. We discussed the pertinent history, physical exam, completed/pending test results (if applicable) and current treatment plan. Please refer to his/her chart for the patient's remaining emergency department course and final disposition. FINAL IMPRESSION      1. Acute nonintractable headache, unspecified headache type    2. Chest pain, unspecified type          DISPOSITION/PLAN   DISPOSITION        PATIENT REFERREDTO:  No follow-up provider specified.     DISCHARGE MEDICATIONS:  New Prescriptions    No medications on file       DISCONTINUED MEDICATIONS:  Discontinued Medications    No medications on file              (Please note that portions ofthis note were completed with a voice recognition program.  Efforts were made to edit the dictations but occasionally words are mis-transcribed.)    Lucía Carreno PA-C (electronically signed)             Lucía Carreno PA-C  04/22/22 7248

## 2022-04-22 NOTE — Clinical Note
Estefania Abel was seen and treated in our emergency department on 4/22/2022. She may return to work on 04/25/2022. If you have any questions or concerns, please don't hesitate to call.       Joe Orozco PA-C

## 2022-04-22 NOTE — ED PROVIDER NOTES
Emergency Department Encounter  Location: [address] [place] [address]    Patient: Radha Schmidt  MRN: [id number]  : 1980  Date of evaluation: 2022  ED Provider: Beau Clay PA-C    1830PM  Hammad Ibarra was checked out to me by Kenzie Candelario PA-C. Please see his/her initial documentation for details of the patient's initial ED presentation, physical exam and completed studies. In brief, Radha Schmidt is a 43 y.o. female that presented to the emergency department for a headache, vomiting x 2 days. Began having chest pain today. She is pending a delta troponin, CXR, and CT abd pelv at time of sign-out.     I have reviewed and interpreted all of the currently available lab results and diagnostics from this visit:  Results for orders placed or performed during the hospital encounter of 22   COVID-19, Rapid    Specimen: Nasopharyngeal   Result Value Ref Range    Source UNKNOWN     SARS-CoV-2, NAAT NOT DETECTED NOT DETECTED   Rapid influenza A/B antigens    Specimen: Nasopharyngeal   Result Value Ref Range    Rapid Influenza A Ag NEGATIVE NEGATIVE    Rapid Influenza B Ag NEGATIVE NEGATIVE   CBC with Auto Differential   Result Value Ref Range    WBC 7.3 4.0 - 10.5 K/CU MM    RBC 4.72 4.2 - 5.4 M/CU MM    Hemoglobin 15.4 12.5 - 16.0 GM/DL    Hematocrit 47.0 37 - 47 %    MCV 99.6 78 - 100 FL    MCH 32.6 (H) 27 - 31 PG    MCHC 32.8 32.0 - 36.0 %    RDW 12.6 11.7 - 14.9 %    Platelets 651 139 - 513 K/CU MM    MPV 10.9 7.5 - 11.1 FL    Differential Type AUTOMATED DIFFERENTIAL     Segs Relative 61.1 36 - 66 %    Lymphocytes % 28.7 24 - 44 %    Monocytes % 6.9 (H) 0 - 4 %    Eosinophils % 2.2 0 - 3 %    Basophils % 0.8 0 - 1 %    Segs Absolute 4.4 K/CU MM    Lymphocytes Absolute 2.1 K/CU MM    Monocytes Absolute 0.5 K/CU MM    Eosinophils Absolute 0.2 K/CU MM    Basophils Absolute 0.1 K/CU MM    Nucleated RBC % 0.0 %    Total Nucleated RBC 0.0 K/CU MM Patient was seen independently. -  Results of delta troponin, CXR, and CT abd pelv discussed with patient. At time of discussion, she feels improved and is anxious for discharge so she can go home and eat. Will dc home with Bentyl and Zofran. Continue rest, hydration. FU with PCP or return for new/worsening symptoms as needed. She is agreeable with plan of care and disposition.  -  Patient dc home. In light of current events, I did utilize appropriate PPE (including N95 and surgical face mask, safety glasses, and gloves, as recommended by the health facility/national standard best practice, during my bedside interactions with the patient. Final Impression      1. Acute nonintractable headache, unspecified headache type    2. Chest pain, unspecified type    3.  Abdominal pain, unspecified abdominal location         DISPOSITION       (Please note that portions of this note may have been completed with a voice recognition program. Efforts were made to edit the dictations but occasionally words are mis-transcribed.)    Rock Palafox, [address], [place]  04/22/22 5830

## 2022-04-23 LAB
EKG ATRIAL RATE: 101 BPM
EKG DIAGNOSIS: NORMAL
EKG P AXIS: 74 DEGREES
EKG P-R INTERVAL: 126 MS
EKG Q-T INTERVAL: 374 MS
EKG QRS DURATION: 82 MS
EKG QTC CALCULATION (BAZETT): 484 MS
EKG R AXIS: 77 DEGREES
EKG T AXIS: 66 DEGREES
EKG VENTRICULAR RATE: 101 BPM

## 2022-04-23 PROCEDURE — 93010 ELECTROCARDIOGRAM REPORT: CPT | Performed by: INTERNAL MEDICINE

## 2022-05-11 NOTE — TELEPHONE ENCOUNTER
----- Message from Mendel Sevin sent at 9/8/2021  3:07 PM EDT -----  Subject: Message to Provider    QUESTIONS  Information for Provider? Patient called with sx including muscle pain, no   taste, and dizzy. Patient has not been exposure to Covid of her knowledge   would like a test did advise the patient of walk in clinics but due to   co-worker calling through the patient had to disconnect   ---------------------------------------------------------------------------  --------------  [address]  What is the best way for the office to contact you? Do not leave any   message, patient will call back for answer  Preferred Call Back Phone Number? [phone]  ---------------------------------------------------------------------------  --------------  SCRIPT ANSWERS  Relationship to Patient?  Self awaiting bed, no change awaiting discharge, no change awaiting bed, no change awaiting dispo

## 2022-07-28 ENCOUNTER — TELEPHONE (OUTPATIENT)
Dept: FAMILY MEDICINE CLINIC | Age: 42
End: 2022-07-28

## 2022-07-28 ENCOUNTER — OFFICE VISIT (OUTPATIENT)
Dept: FAMILY MEDICINE CLINIC | Age: 42
End: 2022-07-28
Payer: MEDICAID

## 2022-07-28 VITALS
TEMPERATURE: 98.3 F | BODY MASS INDEX: 22.18 KG/M2 | SYSTOLIC BLOOD PRESSURE: 100 MMHG | OXYGEN SATURATION: 97 % | DIASTOLIC BLOOD PRESSURE: 80 MMHG | HEIGHT: 66 IN | WEIGHT: 138 LBS | HEART RATE: 104 BPM

## 2022-07-28 DIAGNOSIS — R11.2 NAUSEA VOMITING AND DIARRHEA: ICD-10-CM

## 2022-07-28 DIAGNOSIS — R39.89 BLADDER PAIN: Primary | ICD-10-CM

## 2022-07-28 DIAGNOSIS — R19.7 NAUSEA VOMITING AND DIARRHEA: ICD-10-CM

## 2022-07-28 LAB
BILIRUBIN, POC: ABNORMAL
BLOOD URINE, POC: ABNORMAL
CLARITY, POC: CLEAR
COLOR, POC: YELLOW
GLUCOSE URINE, POC: ABNORMAL
KETONES, POC: ABNORMAL
LEUKOCYTE EST, POC: ABNORMAL
NITRITE, POC: ABNORMAL
PH, POC: 5.5
PROTEIN, POC: ABNORMAL
SPECIFIC GRAVITY, POC: 1.02
UROBILINOGEN, POC: 0.2

## 2022-07-28 PROCEDURE — G8420 CALC BMI NORM PARAMETERS: HCPCS | Performed by: FAMILY MEDICINE

## 2022-07-28 PROCEDURE — 4004F PT TOBACCO SCREEN RCVD TLK: CPT | Performed by: FAMILY MEDICINE

## 2022-07-28 PROCEDURE — 99213 OFFICE O/P EST LOW 20 MIN: CPT | Performed by: FAMILY MEDICINE

## 2022-07-28 PROCEDURE — 81002 URINALYSIS NONAUTO W/O SCOPE: CPT | Performed by: FAMILY MEDICINE

## 2022-07-28 PROCEDURE — G8427 DOCREV CUR MEDS BY ELIG CLIN: HCPCS | Performed by: FAMILY MEDICINE

## 2022-07-28 RX ORDER — PROMETHAZINE HYDROCHLORIDE 25 MG/1
25 TABLET ORAL 4 TIMES DAILY PRN
Qty: 20 TABLET | Refills: 0 | Status: SHIPPED | OUTPATIENT
Start: 2022-07-28 | End: 2022-08-04

## 2022-07-28 ASSESSMENT — PATIENT HEALTH QUESTIONNAIRE - PHQ9: DEPRESSION UNABLE TO ASSESS: PT REFUSES

## 2022-07-28 NOTE — TELEPHONE ENCOUNTER
NURSE TRIAGE PHONE: Patient called state she has been having some bladder pain that started yesterday. Patient stated while she was at work yesterday she started feeling really hot and nauseous, patient stated she kept bouncing back from being cold to hot. Patient stated she has thrown up 3-4 times while working, work sent her home patient stated she woke up with the same symptoms and now she is having diarrhea and still feeling nauseous. Patient stated she is drinking water and trying to keep herself hydrated. Please advise what type of an appointment.

## 2022-07-28 NOTE — LETTER
[facility]  [address]  Phone: [phone]  Fax: [phone]    Venkat Bejarano MD        July 28, 2022     Patient: Hood Villareal   YOB: 1980   Date of Visit: 7/28/2022       To Whom it May Concern:    Reinier Begum was seen in my clinic on 7/28/2022. Please excuse patient from work on 7/27/2022 through 7/29/2022. She may return to work on 8/1/2022. If you have any questions or concerns, please don't hesitate to call.     Sincerely,         Venkat Bejarano MD Pt transported by transport tech to stress test. Will go to room 2320 after. VSS. AOx4. Clothing bagged and sent with client.

## 2022-07-28 NOTE — PROGRESS NOTES
Patient ID: Barbara Dunham 1980    Chief Complaint   Patient presents with    Nausea     Symptoms started yesterday     Diarrhea    Emesis         HPI    Nausea and vomiting since yesterday. Boyfriend with similar symptoms but he is now better. Diarrhea x 2. Was awakened by it. Hot and clammy and feeling like she was having hot flashes    Bladder hurts: onset a few days ago    Review of Systems    Patient Active Problem List   Diagnosis    Gastroesophageal reflux disease without esophagitis    Bone spur on posterior portion of calcaneus    Anxiety    History of colon polyps       Past Surgical History:   Procedure Laterality Date    DILATION AND CURETTAGE OF UTERUS  02/2017       Family History   Problem Relation Age of Onset    No Known Problems Mother     No Known Problems Father     No Known Problems Sister     No Known Problems Brother        Current Outpatient Medications on File Prior to Visit   Medication Sig Dispense Refill    omeprazole (PRILOSEC) 40 MG delayed release capsule Take 1 capsule by mouth daily 90 capsule 3    ibuprofen (ADVIL;MOTRIN) 800 MG tablet Take 1 tablet by mouth every 8 hours as needed for Pain 60 tablet 1    albuterol sulfate HFA (PROVENTIL HFA) 108 (90 Base) MCG/ACT inhaler Inhale 2 puffs into the lungs every 4 hours as needed for Wheezing or Shortness of Breath With spacer (and mask if indicated). Thanks. 1 Inhaler 1    Spacer/Aero-Holding Chambers (E-Z SPACER) JUAQUIN 1 Device by Does not apply route daily as needed (for use with inhaler for wheezing) 1 Device 0    dicyclomine (BENTYL) 10 MG capsule Take 2 capsules by mouth 4 times daily (before meals and nightly) (Patient not taking: Reported on 7/28/2022) 30 capsule 0    ondansetron (ZOFRAN ODT) 4 MG disintegrating tablet Take 1 tablet by mouth every 6 hours (Patient not taking: Reported on 7/28/2022) 20 tablet 0     No current facility-administered medications on file prior to visit.                    Objective: Encounters:   07/28/22 138 lb (62.6 kg)   04/22/22 135 lb (61.2 kg)   02/21/22 138 lb (62.6 kg)     BP Readings from Last 3 Encounters:   07/28/22 100/80   04/22/22 101/66   02/21/22 118/78          Results for orders placed or performed in visit on 07/28/22   POCT Urinalysis no Micro   Result Value Ref Range    Color, UA yellow     Clarity, UA clear     Glucose, UA POC neg     Bilirubin, UA neg     Ketones, UA neg     Spec Grav, UA 1.025     Blood, UA POC small     pH, UA 5.5     Protein, UA POC neg     Urobilinogen, UA 0.2     Leukocytes, UA trace     Nitrite, UA neg            Assessment:       Diagnosis Orders   1. Bladder pain  POCT Urinalysis no Micro    Culture, Urine      2. Nausea vomiting and diarrhea  promethazine (PHENERGAN) 25 MG tablet              Plan:      Probably viral AGE. Lots of hand washing. Off work. See orders    Return if symptoms worsen or fail to improve.

## 2022-07-30 LAB — URINE CULTURE, ROUTINE: NORMAL

## 2022-08-02 ENCOUNTER — TELEPHONE (OUTPATIENT)
Dept: FAMILY MEDICINE CLINIC | Age: 42
End: 2022-08-02

## 2022-08-02 NOTE — TELEPHONE ENCOUNTER
----- Message from Ernesto Nava sent at 8/2/2022 12:19 PM EDT -----  Subject: Results Request    QUESTIONS  Results: urine culture, urinyalsis;  Ordered by: Dede Patel   Date Performed: 2022-07-28  ---------------------------------------------------------------------------  --------------  [employer] INFO    [phone]; OK to leave message on voicemail,Do not leave any message,   patient will call back for answer  ---------------------------------------------------------------------------  --------------

## 2022-09-26 ENCOUNTER — HOSPITAL ENCOUNTER (OUTPATIENT)
Age: 42
Setting detail: SPECIMEN
Discharge: HOME | End: 2022-09-26
Payer: MEDICAID

## 2022-09-26 ENCOUNTER — TELEMEDICINE (OUTPATIENT)
Dept: FAMILY MEDICINE CLINIC | Age: 42
End: 2022-09-26
Payer: MEDICAID

## 2022-09-26 DIAGNOSIS — J01.00 ACUTE MAXILLARY SINUSITIS, RECURRENCE NOT SPECIFIED: Primary | ICD-10-CM

## 2022-09-26 PROCEDURE — 99213 OFFICE O/P EST LOW 20 MIN: CPT | Performed by: FAMILY MEDICINE

## 2022-09-26 PROCEDURE — U0003 INFECTIOUS AGENT DETECTION BY NUCLEIC ACID (DNA OR RNA); SEVERE ACUTE RESPIRATORY SYNDROME CORONAVIRUS 2 (SARS-COV-2) (CORONAVIRUS DISEASE [COVID-19]), AMPLIFIED PROBE TECHNIQUE, MAKING USE OF HIGH THROUGHPUT TECHNOLOGIES AS DESCRIBED BY CMS-2020-01-R: HCPCS

## 2022-09-26 PROCEDURE — G8427 DOCREV CUR MEDS BY ELIG CLIN: HCPCS | Performed by: FAMILY MEDICINE

## 2022-09-26 PROCEDURE — U0005 INFEC AGEN DETEC AMPLI PROBE: HCPCS

## 2022-09-26 ASSESSMENT — PATIENT HEALTH QUESTIONNAIRE - PHQ9
SUM OF ALL RESPONSES TO PHQ QUESTIONS 1-9: 1
SUM OF ALL RESPONSES TO PHQ9 QUESTIONS 1 & 2: 1
SUM OF ALL RESPONSES TO PHQ QUESTIONS 1-9: 1
1. LITTLE INTEREST OR PLEASURE IN DOING THINGS: 1
2. FEELING DOWN, DEPRESSED OR HOPELESS: 0
SUM OF ALL RESPONSES TO PHQ QUESTIONS 1-9: 1
SUM OF ALL RESPONSES TO PHQ QUESTIONS 1-9: 1

## 2022-09-26 NOTE — PROGRESS NOTES
History:   Diagnosis Date    Anxiety     Bone spur 04/2017    left heel of foot    Depression     Diverticulosis     IBS (irritable bowel syndrome)     Menorrhagia     Migraine     Plantar fasciitis    ,   Past Surgical History:   Procedure Laterality Date    DILATION AND CURETTAGE OF UTERUS  02/2017       PHYSICAL EXAMINATION:  [ INSTRUCTIONS:  \"[x]\" Indicates a positive item  \"[]\" Indicates a negative item  -- DELETE ALL ITEMS NOT EXAMINED]  Vital Signs: (As obtained by patient/caregiver or practitioner observation)    Blood pressure-  Heart rate-    Respiratory rate-    Temperature-  Pulse oximetry-     Constitutional: [] Appears well-developed and well-nourished [] No apparent distress      [x] Abnormal- ill appearing. Voice is husky, appears tired  Mental status  [x] Alert and awake  [] Oriented to person/place/time []Able to follow commands      Eyes:  EOM    [x]  Normal  [] Abnormal-  Sclera  []  Normal  [] Abnormal -         Discharge []  None visible  [] Abnormal -    HENT:   [x] Normocephalic, atraumatic. [] Abnormal   [] Mouth/Throat: Mucous membranes are moist.     External Ears [x] Normal  [] Abnormal-     Neck: [x] No visualized mass     Pulmonary/Chest: [x] Respiratory effort normal.  [] No visualized signs of difficulty breathing or respiratory distress        [] Abnormal-      Musculoskeletal:   [] Normal gait with no signs of ataxia         [] Normal range of motion of neck        [] Abnormal-       Neurological:        [x] No Facial Asymmetry (Cranial nerve 7 motor function) (limited exam to video visit)          [] No gaze palsy        [] Abnormal-         Skin:        [x] No significant exanthematous lesions or discoloration noted on facial skin         [] Abnormal-            Psychiatric:       [x] Normal Affect [x] No Hallucinations        [] Abnormal-     Other pertinent observable physical exam findings-      Diagnosis Orders   1.  Acute maxillary sinusitis, recurrence not specified COVID-19        Patient not responding to antibiotics. This is likely due to her having viral infection. We will check for COVID. Continue ibuprofen as needed. Off work tomorrow. Quarantine until results are back. No follow-ups on file. Selvin Rosas, was evaluated through a synchronous (real-time) audio-video encounter. The patient (or guardian if applicable) is aware that this is a billable service, which includes applicable co-pays. This Virtual Visit was conducted with patient's (and/or legal guardian's) consent. The visit was conducted pursuant to the emergency declaration under the [facility], [facility] authority and the [employer] General Act. Patient identification was verified, and a caregiver was present when appropriate. The patient was located at Home: [facility]  [address] [facility] [address] [id number]. Provider was located at Home ([address]: [place]. Total time spent on this encounter: Not billed by time    --Tesha Coto MD on 9/26/2022 at 2:07 PM    An electronic signature was used to authenticate this note.

## 2022-09-27 ENCOUNTER — TELEPHONE (OUTPATIENT)
Dept: FAMILY MEDICINE CLINIC | Age: 42
End: 2022-09-27

## 2022-09-27 DIAGNOSIS — J01.00 ACUTE MAXILLARY SINUSITIS, RECURRENCE NOT SPECIFIED: Primary | ICD-10-CM

## 2022-09-27 LAB
SARS-COV-2: NOT DETECTED
SOURCE: NORMAL

## 2022-09-27 RX ORDER — CLINDAMYCIN HYDROCHLORIDE 300 MG/1
300 CAPSULE ORAL 3 TIMES DAILY
Qty: 30 CAPSULE | Refills: 0 | Status: SHIPPED | OUTPATIENT
Start: 2022-09-27 | End: 2022-10-07

## 2022-09-27 NOTE — TELEPHONE ENCOUNTER
----- Message from Jewel Sexton sent at 9/27/2022 12:09 PM EDT -----  Subject: Results Request    QUESTIONS  Results: COVID TEST; Ordered by: Jayshree Magallanes   Date Performed: 2022-09-26  ---------------------------------------------------------------------------  --------------  Lorena Veronica INFO    [phone];  Do not leave any message, patient will call back for answer  ---------------------------------------------------------------------------  --------------

## 2022-10-07 DIAGNOSIS — F41.8 SITUATIONAL ANXIETY: ICD-10-CM

## 2022-10-07 RX ORDER — LORAZEPAM 0.5 MG/1
TABLET ORAL
Qty: 15 TABLET | OUTPATIENT
Start: 2022-10-07

## 2022-10-24 ENCOUNTER — TELEMEDICINE (OUTPATIENT)
Dept: FAMILY MEDICINE CLINIC | Age: 42
End: 2022-10-24
Payer: MEDICAID

## 2022-10-24 ENCOUNTER — HOSPITAL ENCOUNTER (OUTPATIENT)
Age: 42
Setting detail: SPECIMEN
Discharge: HOME | End: 2022-10-24
Payer: MEDICAID

## 2022-10-24 ENCOUNTER — TELEPHONE (OUTPATIENT)
Dept: FAMILY MEDICINE CLINIC | Age: 42
End: 2022-10-24

## 2022-10-24 DIAGNOSIS — R52 BODY ACHES: ICD-10-CM

## 2022-10-24 DIAGNOSIS — R05.1 ACUTE COUGH: Primary | ICD-10-CM

## 2022-10-24 DIAGNOSIS — J02.9 SORE THROAT: ICD-10-CM

## 2022-10-24 LAB
RAPID INFLUENZA  B AGN: NEGATIVE
RAPID INFLUENZA A AGN: NEGATIVE

## 2022-10-24 PROCEDURE — 87804 INFLUENZA ASSAY W/OPTIC: CPT

## 2022-10-24 PROCEDURE — G8427 DOCREV CUR MEDS BY ELIG CLIN: HCPCS | Performed by: FAMILY MEDICINE

## 2022-10-24 PROCEDURE — U0005 INFEC AGEN DETEC AMPLI PROBE: HCPCS

## 2022-10-24 PROCEDURE — 99213 OFFICE O/P EST LOW 20 MIN: CPT | Performed by: FAMILY MEDICINE

## 2022-10-24 PROCEDURE — U0003 INFECTIOUS AGENT DETECTION BY NUCLEIC ACID (DNA OR RNA); SEVERE ACUTE RESPIRATORY SYNDROME CORONAVIRUS 2 (SARS-COV-2) (CORONAVIRUS DISEASE [COVID-19]), AMPLIFIED PROBE TECHNIQUE, MAKING USE OF HIGH THROUGHPUT TECHNOLOGIES AS DESCRIBED BY CMS-2020-01-R: HCPCS

## 2022-10-24 NOTE — TELEPHONE ENCOUNTER
Walk in clinic called stating that the order for the POCT influenza a/b has  can you reorder it like a send out ( like the way we do for the covid) they are needing a new order for the flu Please place order so patient can have her test done

## 2022-10-24 NOTE — PROGRESS NOTES
History:   Diagnosis Date    Anxiety     Bone spur 04/2017    left heel of foot    Depression     Diverticulosis     IBS (irritable bowel syndrome)     Menorrhagia     Migraine     Plantar fasciitis        PHYSICAL EXAMINATION:  [ INSTRUCTIONS:  \"[x]\" Indicates a positive item  \"[]\" Indicates a negative item  -- DELETE ALL ITEMS NOT EXAMINED]  Vital Signs: (As obtained by patient/caregiver or practitioner observation)    Blood pressure-  Heart rate-    Respiratory rate-    Temperature-  Pulse oximetry-     Constitutional: [] Appears well-developed and well-nourished [] No apparent distress      [x] Abnormal- appears ill, coughing  Mental status  [x] Alert and awake  [x] Oriented to person/place/time []Able to follow commands      Eyes:  EOM    [x]  Normal  [] Abnormal-  Sclera  []  Normal  [] Abnormal -         Discharge []  None visible  [] Abnormal -    HENT:   [x] Normocephalic, atraumatic. [] Abnormal   [] Mouth/Throat: Mucous membranes are moist.     External Ears [x] Normal  [] Abnormal-     Neck: [x] No visualized mass     Pulmonary/Chest: [x] Respiratory effort normal.  [x] No visualized signs of difficulty breathing or respiratory distress        [] Abnormal-      Musculoskeletal:   [] Normal gait with no signs of ataxia         [] Normal range of motion of neck        [] Abnormal-       Neurological:        [x] No Facial Asymmetry (Cranial nerve 7 motor function) (limited exam to video visit)          [] No gaze palsy        [] Abnormal-         Skin:        [x] No significant exanthematous lesions or discoloration noted on facial skin         [] Abnormal-            Psychiatric:       [x] Normal Affect [] No Hallucinations        [] Abnormal-     Other pertinent observable physical exam findings-      Diagnosis Orders   1. Acute cough  POCT Influenza A/B    COVID-19    RAPID INFLUENZA A/B ANTIGENS      2. Sore throat  POCT Influenza A/B    COVID-19    RAPID INFLUENZA A/B ANTIGENS      3.  Body aches  POCT Influenza A/B    COVID-19    RAPID INFLUENZA A/B ANTIGENS          Return if symptoms worsen or fail to improve. Cj Ignacio, was evaluated through a synchronous (real-time) audio-video encounter. The patient (or guardian if applicable) is aware that this is a billable service, which includes applicable co-pays. This Virtual Visit was conducted with patient's (and/or legal guardian's) consent. The visit was conducted pursuant to the emergency declaration under the [address] [place] [address] and the [employer] General Act. Patient identification was verified, and a caregiver was present when appropriate. The patient was located at Home: MI[facility] Skel[facility]  [address] [facility] [id number]. Provider was located at Home ([address]: [place]. Total time spent on this encounter: Not billed by time    --Judge Mayra MD on 10/24/2022 at 4:55 PM    An electronic signature was used to authenticate this note.

## 2022-10-25 LAB
SARS-COV-2: NOT DETECTED
SOURCE: NORMAL

## 2023-04-13 ENCOUNTER — TELEPHONE (OUTPATIENT)
Dept: FAMILY MEDICINE CLINIC | Age: 43
End: 2023-04-13

## 2023-04-14 PROBLEM — Z72.0 TOBACCO ABUSE: Status: ACTIVE | Noted: 2023-04-14

## 2023-04-17 ENCOUNTER — TELEPHONE (OUTPATIENT)
Dept: FAMILY MEDICINE CLINIC | Age: 43
End: 2023-04-17

## 2023-04-17 DIAGNOSIS — N94.6 DYSMENORRHEA: ICD-10-CM

## 2023-04-17 RX ORDER — IBUPROFEN 800 MG/1
800 TABLET ORAL EVERY 8 HOURS PRN
Qty: 60 TABLET | Refills: 1 | Status: SHIPPED | OUTPATIENT
Start: 2023-04-17

## 2023-04-17 NOTE — TELEPHONE ENCOUNTER
She said she takes it for her menstrual cramps. She said that she was getting these from you for it.  Just forgot to ask for a refill at her appointment

## 2023-11-17 ENCOUNTER — TELEPHONE (OUTPATIENT)
Dept: FAMILY MEDICINE CLINIC | Age: 43
End: 2023-11-17

## 2023-11-17 NOTE — TELEPHONE ENCOUNTER
Patient has cough, diarrhea, body aches, fatigue. Patient was offered  an appointment to come into office, however she would like to do a virtual appointment due to her not feeling well. Patient has not taken a covid test and does not have one at home to take.  Is it okay for patient to do virtual appt

## 2023-12-27 ENCOUNTER — OFFICE VISIT (OUTPATIENT)
Dept: FAMILY MEDICINE CLINIC | Age: 43
End: 2023-12-27
Payer: MEDICAID

## 2023-12-27 ENCOUNTER — HOSPITAL ENCOUNTER (OUTPATIENT)
Age: 43
Setting detail: SPECIMEN
Discharge: HOME | End: 2023-12-27
Payer: MEDICAID

## 2023-12-27 VITALS
DIASTOLIC BLOOD PRESSURE: 82 MMHG | SYSTOLIC BLOOD PRESSURE: 122 MMHG | BODY MASS INDEX: 23.73 KG/M2 | OXYGEN SATURATION: 98 % | WEIGHT: 147 LBS | TEMPERATURE: 97.9 F | RESPIRATION RATE: 17 BRPM | HEART RATE: 94 BPM

## 2023-12-27 DIAGNOSIS — R11.2 NAUSEA AND VOMITING, UNSPECIFIED VOMITING TYPE: ICD-10-CM

## 2023-12-27 DIAGNOSIS — R05.1 ACUTE COUGH: ICD-10-CM

## 2023-12-27 DIAGNOSIS — J01.10 ACUTE FRONTAL SINUSITIS, RECURRENCE NOT SPECIFIED: Primary | ICD-10-CM

## 2023-12-27 LAB
SARS-COV-2 RNA RESP QL NAA+PROBE: NOT DETECTED
SOURCE: NORMAL

## 2023-12-27 PROCEDURE — 87635 SARS-COV-2 COVID-19 AMP PRB: CPT

## 2023-12-27 PROCEDURE — 4004F PT TOBACCO SCREEN RCVD TLK: CPT | Performed by: FAMILY MEDICINE

## 2023-12-27 PROCEDURE — 99213 OFFICE O/P EST LOW 20 MIN: CPT | Performed by: FAMILY MEDICINE

## 2023-12-27 PROCEDURE — G8420 CALC BMI NORM PARAMETERS: HCPCS | Performed by: FAMILY MEDICINE

## 2023-12-27 PROCEDURE — G8427 DOCREV CUR MEDS BY ELIG CLIN: HCPCS | Performed by: FAMILY MEDICINE

## 2023-12-27 PROCEDURE — G8484 FLU IMMUNIZE NO ADMIN: HCPCS | Performed by: FAMILY MEDICINE

## 2023-12-27 RX ORDER — AMOXICILLIN AND CLAVULANATE POTASSIUM 875; 125 MG/1; MG/1
1 TABLET, FILM COATED ORAL 2 TIMES DAILY
Qty: 20 TABLET | Refills: 0 | Status: SHIPPED | OUTPATIENT
Start: 2023-12-27 | End: 2024-01-06

## 2023-12-27 RX ORDER — PROMETHAZINE HYDROCHLORIDE 25 MG/1
25 TABLET ORAL 4 TIMES DAILY PRN
Qty: 20 TABLET | Refills: 0 | Status: SHIPPED | OUTPATIENT
Start: 2023-12-27 | End: 2024-01-03

## 2023-12-27 RX ORDER — PROMETHAZINE HYDROCHLORIDE 25 MG/ML
25 INJECTION, SOLUTION INTRAMUSCULAR; INTRAVENOUS ONCE
Status: COMPLETED | OUTPATIENT
Start: 2023-12-27 | End: 2023-12-27

## 2023-12-27 RX ORDER — ALBUTEROL SULFATE 90 UG/1
2 AEROSOL, METERED RESPIRATORY (INHALATION) EVERY 4 HOURS PRN
Qty: 18 G | Refills: 0 | Status: SHIPPED | OUTPATIENT
Start: 2023-12-27

## 2023-12-27 RX ADMIN — PROMETHAZINE HYDROCHLORIDE 25 MG: 25 INJECTION, SOLUTION INTRAMUSCULAR; INTRAVENOUS at 11:10

## 2023-12-27 NOTE — PATIENT INSTRUCTIONS
BRING YOUR MEDICATION BOTTLES TO ALL APPOINTMENTS    Check MY CHART for test results. If you have forgotten your password, call [phone]. The diagnoses and medications listed in this after visit summary may not be up to date. Check MY CHART in 28-48 hours for corrections. Late cancellation policy: So that we can better accommodate people who are sick, please give our office 24 hour notice for an appointment cancellation. Missed appointments: Your care is very important to us. It is important that you keep your scheduled appointments. Multiple missed appointments will lead to a dismissal from the office. Patients arriving late will be worked into the schedule as time permits, with patients arriving on time taken as scheduled. Late arriving patients are more than welcome to wait or reschedule their appointments. Please allow 5-7 business days for paperwork to be processed.

## 2023-12-27 NOTE — PROGRESS NOTES
Readings from Last 3 Encounters:   12/27/23 66.7 kg (147 lb)   04/14/23 64.4 kg (142 lb)   07/28/22 62.6 kg (138 lb)     BP Readings from Last 3 Encounters:   12/27/23 122/82   04/14/23 118/78   07/28/22 100/80          No results found for this visit on 12/27/23. Assessment:       Diagnosis Orders   1. Acute frontal sinusitis, recurrence not specified  amoxicillin-clavulanate (AUGMENTIN) 875-125 MG per tablet    COVID-19      2. Nausea and vomiting, unspecified vomiting type  promethazine (PHENERGAN) 25 MG tablet    promethazine (PHENERGAN) injection 25 mg      3. Acute cough  albuterol sulfate HFA (VENTOLIN HFA) 108 (90 Base) MCG/ACT inhaler              Plan:      See orders    Lots of hand washing. Off work next 2 day.s    No follow-ups on file.

## 2024-03-21 ENCOUNTER — OFFICE VISIT (OUTPATIENT)
Dept: FAMILY MEDICINE CLINIC | Age: 44
End: 2024-03-21
Payer: MEDICAID

## 2024-03-21 VITALS
DIASTOLIC BLOOD PRESSURE: 86 MMHG | HEART RATE: 110 BPM | SYSTOLIC BLOOD PRESSURE: 124 MMHG | WEIGHT: 142 LBS | BODY MASS INDEX: 22.92 KG/M2 | TEMPERATURE: 98.2 F | OXYGEN SATURATION: 97 %

## 2024-03-21 DIAGNOSIS — R11.2 NAUSEA AND VOMITING, UNSPECIFIED VOMITING TYPE: Primary | ICD-10-CM

## 2024-03-21 PROCEDURE — 99213 OFFICE O/P EST LOW 20 MIN: CPT | Performed by: FAMILY MEDICINE

## 2024-03-21 PROCEDURE — G8484 FLU IMMUNIZE NO ADMIN: HCPCS | Performed by: FAMILY MEDICINE

## 2024-03-21 PROCEDURE — G8427 DOCREV CUR MEDS BY ELIG CLIN: HCPCS | Performed by: FAMILY MEDICINE

## 2024-03-21 PROCEDURE — G8420 CALC BMI NORM PARAMETERS: HCPCS | Performed by: FAMILY MEDICINE

## 2024-03-21 PROCEDURE — 4004F PT TOBACCO SCREEN RCVD TLK: CPT | Performed by: FAMILY MEDICINE

## 2024-03-21 RX ORDER — PROMETHAZINE HYDROCHLORIDE 25 MG/ML
25 INJECTION, SOLUTION INTRAMUSCULAR; INTRAVENOUS ONCE
Status: COMPLETED | OUTPATIENT
Start: 2024-03-21 | End: 2024-03-21

## 2024-03-21 RX ORDER — PROMETHAZINE HYDROCHLORIDE 25 MG/1
25 TABLET ORAL 4 TIMES DAILY PRN
Qty: 20 TABLET | Refills: 0 | Status: SHIPPED | OUTPATIENT
Start: 2024-03-21 | End: 2024-03-28

## 2024-03-21 RX ADMIN — PROMETHAZINE HYDROCHLORIDE 25 MG: 25 INJECTION, SOLUTION INTRAMUSCULAR; INTRAVENOUS at 16:23

## 2024-03-21 ASSESSMENT — PATIENT HEALTH QUESTIONNAIRE - PHQ9
1. LITTLE INTEREST OR PLEASURE IN DOING THINGS: NOT AT ALL
2. FEELING DOWN, DEPRESSED OR HOPELESS: NOT AT ALL
SUM OF ALL RESPONSES TO PHQ QUESTIONS 1-9: 0
SUM OF ALL RESPONSES TO PHQ9 QUESTIONS 1 & 2: 0
SUM OF ALL RESPONSES TO PHQ QUESTIONS 1-9: 0

## 2024-03-21 NOTE — PROGRESS NOTES
Patient ID: Anna Ibarra 1980    Chief Complaint   Patient presents with    Nausea     2 days    Emesis     2 days not able to keep anything down     Sweats         HPI  Nausea and vomiting: x 2 days.  Only had grapes yesterday and vomiting.  Hot flashes.  No unusual bowel movements      Patient Active Problem List   Diagnosis    Gastroesophageal reflux disease without esophagitis    Bone spur on posterior portion of calcaneus    Anxiety    History of colon polyps    Tobacco abuse       Past Surgical History:   Procedure Laterality Date    DILATION AND CURETTAGE OF UTERUS  02/2017       Family History   Problem Relation Age of Onset    No Known Problems Mother     No Known Problems Father     No Known Problems Sister     No Known Problems Brother        Current Outpatient Medications on File Prior to Visit   Medication Sig Dispense Refill    albuterol sulfate HFA (VENTOLIN HFA) 108 (90 Base) MCG/ACT inhaler Inhale 2 puffs into the lungs every 4 hours as needed for Wheezing 18 g 0    ibuprofen (ADVIL;MOTRIN) 800 MG tablet Take 1 tablet by mouth every 8 hours as needed for Pain 60 tablet 1    omeprazole (PRILOSEC) 40 MG delayed release capsule Take 1 capsule by mouth daily 90 capsule 3    dicyclomine (BENTYL) 10 MG capsule Take 2 capsules by mouth 4 times daily (before meals and nightly) (Patient not taking: Reported on 7/28/2022) 30 capsule 0    ondansetron (ZOFRAN ODT) 4 MG disintegrating tablet Take 1 tablet by mouth every 6 hours (Patient not taking: Reported on 10/24/2022) 20 tablet 0    albuterol sulfate HFA (PROVENTIL HFA) 108 (90 Base) MCG/ACT inhaler Inhale 2 puffs into the lungs every 4 hours as needed for Wheezing or Shortness of Breath With spacer (and mask if indicated). Thanks. 1 Inhaler 1    Spacer/Aero-Holding Chambers (E-Z SPACER) JUAQUIN 1 Device by Does not apply route daily as needed (for use with inhaler for wheezing) (Patient not taking: Reported on 9/26/2022) 1 Device 0     No current

## 2024-03-21 NOTE — PATIENT INSTRUCTIONS
NEW OFFICE HOURS: M-TH 7AM-5PM      BRING YOUR MEDICATION BOTTLES TO ALL APPOINTMENTS    Check MY CHART for test results.  If you have forgotten your password, call [phone].    The diagnoses and medications listed in this after visit summary may not be up to date.  Check MY CHART in 28-48 hours for corrections.      Late cancellation policy: So that we can better accommodate people who are sick, please give our office 24 hour notice for an appointment cancellation.      Missed appointments: Your care is very important to us.  It is important that you keep your scheduled appointments.   Multiple missed appointments will lead to a dismissal from the office.      Patients arriving late will be worked into the schedule as time permits, with patients arriving on time taken as scheduled. Late arriving patients are more than welcome to wait or reschedule their appointments.    Please allow 5-7 business days for paperwork to be processed.

## 2024-04-09 ENCOUNTER — TELEPHONE (OUTPATIENT)
Dept: FAMILY MEDICINE CLINIC | Age: 44
End: 2024-04-09

## 2024-04-09 NOTE — TELEPHONE ENCOUNTER
Looks like this was last prescribed at her physical exam visit 1 year ago.  Can she do a virtual appt now?

## 2024-04-09 NOTE — TELEPHONE ENCOUNTER
Patient is heading to work and can not do virtual today.  Patient scheduled virtual appt for 4/10/24.

## 2024-04-10 ENCOUNTER — TELEMEDICINE (OUTPATIENT)
Dept: FAMILY MEDICINE CLINIC | Age: 44
End: 2024-04-10
Payer: MEDICAID

## 2024-04-10 DIAGNOSIS — K21.9 GASTROESOPHAGEAL REFLUX DISEASE WITHOUT ESOPHAGITIS: ICD-10-CM

## 2024-04-10 PROCEDURE — G8427 DOCREV CUR MEDS BY ELIG CLIN: HCPCS | Performed by: FAMILY MEDICINE

## 2024-04-10 PROCEDURE — 99213 OFFICE O/P EST LOW 20 MIN: CPT | Performed by: FAMILY MEDICINE

## 2024-04-10 RX ORDER — OMEPRAZOLE 40 MG/1
40 CAPSULE, DELAYED RELEASE ORAL
Qty: 90 CAPSULE | Refills: 4 | Status: SHIPPED | OUTPATIENT
Start: 2024-04-10

## 2024-04-10 NOTE — PROGRESS NOTES
4/10/2024    TELEHEALTH EVALUATION -- Audio/Visual    HPI:    Anna Ibarra (:  1980) has requested an audio/video evaluation for the following concern(s):    GERD: takes and needs Prilosec daily.  Ate lasagna last night and it flared    Review of Systems    Prior to Visit Medications    Medication Sig Taking? Authorizing Provider   omeprazole (PRILOSEC) 40 MG delayed release capsule Take 1 capsule by mouth every morning (before breakfast) Yes Brenda Becerra MD   albuterol sulfate HFA (VENTOLIN HFA) 108 (90 Base) MCG/ACT inhaler Inhale 2 puffs into the lungs every 4 hours as needed for Wheezing Yes Brenda Becerra MD   ibuprofen (ADVIL;MOTRIN) 800 MG tablet Take 1 tablet by mouth every 8 hours as needed for Pain  Brenda Becerra MD   dicyclomine (BENTYL) 10 MG capsule Take 2 capsules by mouth 4 times daily (before meals and nightly)  Patient not taking: Reported on 2022  Francisca Mcghee PA-C   ondansetron (ZOFRAN ODT) 4 MG disintegrating tablet Take 1 tablet by mouth every 6 hours  Patient not taking: Reported on 10/24/2022  Francisca Mcghee PA-C   albuterol sulfate HFA (PROVENTIL HFA) 108 (90 Base) MCG/ACT inhaler Inhale 2 puffs into the lungs every 4 hours as needed for Wheezing or Shortness of Breath With spacer (and mask if indicated). Thanks.  Brenda Becerra MD   Spacer/Aero-Holding Chambers (E-Z SPACER) JUAQUIN 1 Device by Does not apply route daily as needed (for use with inhaler for wheezing)  Patient not taking: Reported on 2022  Jen Villanueva MD       Social History     Tobacco Use    Smoking status: Every Day     Current packs/day: 1.00     Average packs/day: 1 pack/day for 27.3 years (27.3 ttl pk-yrs)     Types: Cigarettes     Start date:     Smokeless tobacco: Never   Substance Use Topics    Alcohol use: Yes     Comment: soc    Drug use: No        Allergies   Allergen Reactions    Sulfa Antibiotics Itching   ,   Past Medical History:   Diagnosis Date    Anxiety     Bone spur

## 2024-04-10 NOTE — PATIENT INSTRUCTIONS
NEW OFFICE HOURS: M-TH 7AM-5PM      BRING YOUR MEDICATION BOTTLES TO ALL APPOINTMENTS    Check MY CHART for test results.  If you have forgotten your password, call [phone].    The diagnoses and medications listed in this after visit summary may not be up to date.  Check MY CHART in 28-48 hours for corrections.      Late cancellation policy: So that we can better accommodate people who are sick, please give our office 24 hour notice for an appointment cancellation.      Missed appointments: Your care is very important to us.  It is important that you keep your scheduled appointments.   Multiple missed appointments will lead to a dismissal from the office.      Patients arriving late will be worked into the schedule as time permits, with patients arriving on time taken as scheduled. Late arriving patients are more than welcome to wait or reschedule their appointments.    Please allow 5-7 business days for paperwork to be processed.

## 2024-09-26 ENCOUNTER — OFFICE VISIT (OUTPATIENT)
Dept: FAMILY MEDICINE CLINIC | Age: 44
End: 2024-09-26
Payer: MEDICAID

## 2024-09-26 VITALS
SYSTOLIC BLOOD PRESSURE: 132 MMHG | WEIGHT: 161 LBS | BODY MASS INDEX: 25.99 KG/M2 | HEART RATE: 109 BPM | OXYGEN SATURATION: 99 % | DIASTOLIC BLOOD PRESSURE: 86 MMHG | TEMPERATURE: 98 F

## 2024-09-26 DIAGNOSIS — R05.1 ACUTE COUGH: ICD-10-CM

## 2024-09-26 DIAGNOSIS — J02.9 SORE THROAT: ICD-10-CM

## 2024-09-26 DIAGNOSIS — J01.00 ACUTE NON-RECURRENT MAXILLARY SINUSITIS: Primary | ICD-10-CM

## 2024-09-26 LAB
Lab: NORMAL
PERFORMING INSTRUMENT: NORMAL
QC PASS/FAIL: NORMAL
S PYO AG THROAT QL: NORMAL
SARS-COV-2, POC: NORMAL

## 2024-09-26 PROCEDURE — G8419 CALC BMI OUT NRM PARAM NOF/U: HCPCS | Performed by: NURSE PRACTITIONER

## 2024-09-26 PROCEDURE — 4004F PT TOBACCO SCREEN RCVD TLK: CPT | Performed by: NURSE PRACTITIONER

## 2024-09-26 PROCEDURE — 87426 SARSCOV CORONAVIRUS AG IA: CPT | Performed by: NURSE PRACTITIONER

## 2024-09-26 PROCEDURE — 99213 OFFICE O/P EST LOW 20 MIN: CPT | Performed by: NURSE PRACTITIONER

## 2024-09-26 PROCEDURE — 87880 STREP A ASSAY W/OPTIC: CPT | Performed by: NURSE PRACTITIONER

## 2024-09-26 PROCEDURE — G8427 DOCREV CUR MEDS BY ELIG CLIN: HCPCS | Performed by: NURSE PRACTITIONER

## 2024-09-26 RX ORDER — DOXYCYCLINE HYCLATE 100 MG
100 TABLET ORAL 2 TIMES DAILY
Qty: 20 TABLET | Refills: 0 | Status: SHIPPED | OUTPATIENT
Start: 2024-09-26 | End: 2024-10-06

## 2024-09-26 RX ORDER — FLUTICASONE PROPIONATE 50 MCG
1 SPRAY, SUSPENSION (ML) NASAL DAILY
Qty: 16 G | Refills: 0 | Status: SHIPPED | OUTPATIENT
Start: 2024-09-26

## 2024-09-26 RX ORDER — BENZONATATE 100 MG/1
100 CAPSULE ORAL 3 TIMES DAILY PRN
Qty: 20 CAPSULE | Refills: 0 | Status: SHIPPED | OUTPATIENT
Start: 2024-09-26

## 2024-09-26 ASSESSMENT — ENCOUNTER SYMPTOMS
SWOLLEN GLANDS: 0
SINUS PAIN: 1
SHORTNESS OF BREATH: 1
SINUS PRESSURE: 1
NAUSEA: 1
DIARRHEA: 0
WHEEZING: 1
VOMITING: 1
ABDOMINAL PAIN: 0
SORE THROAT: 1
COUGH: 1
RHINORRHEA: 0
CHEST TIGHTNESS: 1

## 2024-09-30 ENCOUNTER — TELEPHONE (OUTPATIENT)
Dept: FAMILY MEDICINE CLINIC | Age: 44
End: 2024-09-30

## 2024-09-30 DIAGNOSIS — J01.00 ACUTE NON-RECURRENT MAXILLARY SINUSITIS: Primary | ICD-10-CM

## 2024-09-30 NOTE — TELEPHONE ENCOUNTER
Patient was seen at the walk in clinic on 9/26/2024, she stated she was placed on an ABX, the first 3 doses she felt fin, she has thrown up the 4th 5th and 6th dose. Patient is asking for something different to be called in.

## 2025-03-17 ENCOUNTER — OFFICE VISIT (OUTPATIENT)
Dept: FAMILY MEDICINE CLINIC | Age: 45
End: 2025-03-17
Payer: MEDICAID

## 2025-03-17 VITALS
BODY MASS INDEX: 25.33 KG/M2 | OXYGEN SATURATION: 97 % | SYSTOLIC BLOOD PRESSURE: 140 MMHG | WEIGHT: 157.6 LBS | HEIGHT: 66 IN | HEART RATE: 100 BPM | DIASTOLIC BLOOD PRESSURE: 97 MMHG

## 2025-03-17 DIAGNOSIS — Z12.31 ENCOUNTER FOR SCREENING MAMMOGRAM FOR MALIGNANT NEOPLASM OF BREAST: ICD-10-CM

## 2025-03-17 DIAGNOSIS — J30.9 ALLERGIC RHINITIS, UNSPECIFIED SEASONALITY, UNSPECIFIED TRIGGER: ICD-10-CM

## 2025-03-17 DIAGNOSIS — G43.909 MIGRAINE WITHOUT STATUS MIGRAINOSUS, NOT INTRACTABLE, UNSPECIFIED MIGRAINE TYPE: ICD-10-CM

## 2025-03-17 DIAGNOSIS — R03.0 ELEVATED BLOOD PRESSURE READING: ICD-10-CM

## 2025-03-17 DIAGNOSIS — Z72.0 TOBACCO ABUSE: ICD-10-CM

## 2025-03-17 DIAGNOSIS — F10.10 ENGAGES IN BINGE CONSUMPTION OF ALCOHOL: ICD-10-CM

## 2025-03-17 DIAGNOSIS — Z00.00 ANNUAL PHYSICAL EXAM: Primary | ICD-10-CM

## 2025-03-17 DIAGNOSIS — K21.9 GASTROESOPHAGEAL REFLUX DISEASE WITHOUT ESOPHAGITIS: ICD-10-CM

## 2025-03-17 DIAGNOSIS — L84 CALLUS OF FOOT: ICD-10-CM

## 2025-03-17 DIAGNOSIS — R63.5 WEIGHT GAIN: ICD-10-CM

## 2025-03-17 DIAGNOSIS — Z12.4 ENCOUNTER FOR SCREENING FOR MALIGNANT NEOPLASM OF CERVIX: ICD-10-CM

## 2025-03-17 PROBLEM — F41.9 ANXIETY: Status: RESOLVED | Noted: 2018-04-23 | Resolved: 2025-03-17

## 2025-03-17 PROCEDURE — 99214 OFFICE O/P EST MOD 30 MIN: CPT | Performed by: FAMILY MEDICINE

## 2025-03-17 PROCEDURE — G8427 DOCREV CUR MEDS BY ELIG CLIN: HCPCS | Performed by: FAMILY MEDICINE

## 2025-03-17 PROCEDURE — 4004F PT TOBACCO SCREEN RCVD TLK: CPT | Performed by: FAMILY MEDICINE

## 2025-03-17 PROCEDURE — G8419 CALC BMI OUT NRM PARAM NOF/U: HCPCS | Performed by: FAMILY MEDICINE

## 2025-03-17 PROCEDURE — 99396 PREV VISIT EST AGE 40-64: CPT | Performed by: FAMILY MEDICINE

## 2025-03-17 RX ORDER — ONDANSETRON 4 MG/1
4 TABLET, ORALLY DISINTEGRATING ORAL EVERY 6 HOURS
Qty: 20 TABLET | Refills: 1 | Status: SHIPPED | OUTPATIENT
Start: 2025-03-17

## 2025-03-17 RX ORDER — FLUTICASONE PROPIONATE 50 MCG
1 SPRAY, SUSPENSION (ML) NASAL DAILY
Qty: 3 EACH | Refills: 3 | Status: SHIPPED | OUTPATIENT
Start: 2025-03-17

## 2025-03-17 RX ORDER — SUMATRIPTAN SUCCINATE 100 MG/1
100 TABLET ORAL
Qty: 27 TABLET | Refills: 1 | Status: SHIPPED | OUTPATIENT
Start: 2025-03-17 | End: 2025-03-17

## 2025-03-17 RX ORDER — FAMOTIDINE 40 MG/1
40 TABLET, FILM COATED ORAL EVERY EVENING
Qty: 30 TABLET | Refills: 0 | Status: SHIPPED | OUTPATIENT
Start: 2025-03-17

## 2025-03-17 RX ORDER — OMEPRAZOLE 40 MG/1
40 CAPSULE, DELAYED RELEASE ORAL
Qty: 90 CAPSULE | Refills: 4 | Status: SHIPPED | OUTPATIENT
Start: 2025-03-17

## 2025-03-17 ASSESSMENT — PATIENT HEALTH QUESTIONNAIRE - PHQ9
2. FEELING DOWN, DEPRESSED OR HOPELESS: SEVERAL DAYS
SUM OF ALL RESPONSES TO PHQ QUESTIONS 1-9: 2
1. LITTLE INTEREST OR PLEASURE IN DOING THINGS: SEVERAL DAYS

## 2025-03-17 NOTE — PATIENT INSTRUCTIONS
kidney beans to salads. Make burritos and tacos with mashed dee beans or black beans.  Where can you learn more?  Go to https://www.[employer].net/patientEd and enter H967 to learn more about \"DASH Diet: Care Instructions.\"  Current as of: October 7, 2024  Content Version: 14.4  © 9882-1775 [employer].   Care instructions adapted under license by [employer]. If you have questions about a medical condition or this instruction, always ask your healthcare professional. [employer] disclaims any warranty or liability for your use of this information.

## 2025-03-17 NOTE — PROGRESS NOTES
History and Physical      Anna Ibarra  YOB: 1980    Date of Service:  3/17/2025    Chief Complaint:   Anna Ibarra is a 44 y.o. female who presents for complete physical examination.    HPI:   History of Present Illness  The patient is a 44-year-old female who presents for a physical and Pap smear.    Family History  - Reports no new family history of cancer, stroke, or diabetes.    Alcohol Consumption  - Consumed four or more alcoholic beverages on four occasions within the past year (New Year's, birthday, festival, river bash).    Occupation  - Involves significant physical activity, including running, lifting, pushing, and pulling.    Oral Hygiene  - Maintains good oral hygiene, brushing teeth two to three times daily.    Childbirth and Gynecological Health  - Had three natural childbirths.  - Reports no vaginal discharge.  - No concerns about sexually transmitted diseases (STDs).    Acid Reflux  - Experiences severe acid reflux if a dose of medication is missed.  - Occasionally suffers from acid reflux even with regular dosing.  - Avoids fatty foods and certain fruits and vegetables that exacerbate symptoms.  - Takes Prilosec 30 minutes before the first meal of the day.  - Underwent an upper endoscopy with no abnormalities found in the throat.  - Underwent a colonoscopy during which benign polyps were removed.    Weight Gain  - Noticed weight gain since turning 37 despite maintaining a low-calorie diet and engaging in physical labor.  - Concerned about potential weight gain if she quits smoking.  - Typically consumes one small meal per day.  - Recalls being prescribed a medication to be taken at night but cannot remember its name.  - Fasting upon waking up, consumed only a piece of cheese and turkey yesterday.    Migraines  - Experiences migraines approximately once every two months.  - Migraines are primarily stress-induced but can also occur spontaneously.  - Does not believe migraines  Hide Additional Notes?: No Detail Level: Detailed

## 2025-03-18 LAB
ALBUMIN SERPL-MCNC: 4 G/DL (ref 3.4–5)
ALBUMIN/GLOB SERPL: 1.9 {RATIO} (ref 1.1–2.2)
ALP SERPL-CCNC: 105 U/L (ref 40–129)
ALT SERPL-CCNC: 23 U/L (ref 10–40)
ANION GAP SERPL CALCULATED.3IONS-SCNC: 8 MMOL/L (ref 3–16)
AST SERPL-CCNC: 22 U/L (ref 15–37)
BASOPHILS # BLD: 0 K/UL (ref 0–0.2)
BASOPHILS NFR BLD: 0.7 %
BILIRUB SERPL-MCNC: <0.2 MG/DL (ref 0–1)
BUN SERPL-MCNC: 11 MG/DL (ref 7–20)
CALCIUM SERPL-MCNC: 8.8 MG/DL (ref 8.3–10.6)
CHLORIDE SERPL-SCNC: 103 MMOL/L (ref 99–110)
CHOLEST SERPL-MCNC: 186 MG/DL (ref 0–199)
CO2 SERPL-SCNC: 27 MMOL/L (ref 21–32)
CREAT SERPL-MCNC: 0.6 MG/DL (ref 0.6–1.1)
DEPRECATED RDW RBC AUTO: 14.5 % (ref 12.4–15.4)
EOSINOPHIL # BLD: 0.2 K/UL (ref 0–0.6)
EOSINOPHIL NFR BLD: 2.4 %
EST. AVERAGE GLUCOSE BLD GHB EST-MCNC: 99.7 MG/DL
GFR SERPLBLD CREATININE-BSD FMLA CKD-EPI: >90 ML/MIN/{1.73_M2}
GLUCOSE SERPL-MCNC: 85 MG/DL (ref 70–99)
HBA1C MFR BLD: 5.1 %
HCT VFR BLD AUTO: 44.4 % (ref 36–48)
HDLC SERPL-MCNC: 50 MG/DL (ref 40–60)
HGB BLD-MCNC: 14.8 G/DL (ref 12–16)
LDLC SERPL CALC-MCNC: 107 MG/DL
LYMPHOCYTES # BLD: 1.7 K/UL (ref 1–5.1)
LYMPHOCYTES NFR BLD: 27.6 %
MCH RBC QN AUTO: 32 PG (ref 26–34)
MCHC RBC AUTO-ENTMCNC: 33.3 G/DL (ref 31–36)
MCV RBC AUTO: 96 FL (ref 80–100)
MONOCYTES # BLD: 0.4 K/UL (ref 0–1.3)
MONOCYTES NFR BLD: 6.7 %
NEUTROPHILS # BLD: 3.9 K/UL (ref 1.7–7.7)
NEUTROPHILS NFR BLD: 62.6 %
PLATELET # BLD AUTO: 242 K/UL (ref 135–450)
PMV BLD AUTO: 10.1 FL (ref 5–10.5)
POTASSIUM SERPL-SCNC: 4.3 MMOL/L (ref 3.5–5.1)
PROT SERPL-MCNC: 6.1 G/DL (ref 6.4–8.2)
RBC # BLD AUTO: 4.63 M/UL (ref 4–5.2)
SODIUM SERPL-SCNC: 138 MMOL/L (ref 136–145)
TRIGL SERPL-MCNC: 144 MG/DL (ref 0–150)
TSH SERPL DL<=0.005 MIU/L-ACNC: 0.45 UIU/ML (ref 0.27–4.2)
VLDLC SERPL CALC-MCNC: 29 MG/DL
WBC # BLD AUTO: 6.3 K/UL (ref 4–11)

## 2025-03-19 ENCOUNTER — RESULTS FOLLOW-UP (OUTPATIENT)
Dept: FAMILY MEDICINE CLINIC | Age: 45
End: 2025-03-19

## 2025-03-19 LAB
HPV HR 12 DNA SPEC QL NAA+PROBE: NOT DETECTED
HPV16 DNA SPEC QL NAA+PROBE: NOT DETECTED
HPV16+18+H RISK 12 DNA SPEC-IMP: NORMAL
HPV18 DNA SPEC QL NAA+PROBE: NOT DETECTED

## 2025-04-17 DIAGNOSIS — K21.9 GASTROESOPHAGEAL REFLUX DISEASE WITHOUT ESOPHAGITIS: ICD-10-CM

## 2025-04-22 RX ORDER — FAMOTIDINE 40 MG/1
40 TABLET, FILM COATED ORAL EVERY EVENING
Qty: 30 TABLET | Refills: 0 | OUTPATIENT
Start: 2025-04-22

## 2025-06-27 ENCOUNTER — COMMUNITY OUTREACH (OUTPATIENT)
Dept: FAMILY MEDICINE CLINIC | Age: 45
End: 2025-06-27

## 2025-07-24 ENCOUNTER — OFFICE VISIT (OUTPATIENT)
Dept: FAMILY MEDICINE CLINIC | Age: 45
End: 2025-07-24

## 2025-07-24 VITALS
WEIGHT: 154 LBS | HEIGHT: 66 IN | DIASTOLIC BLOOD PRESSURE: 72 MMHG | OXYGEN SATURATION: 97 % | HEART RATE: 96 BPM | BODY MASS INDEX: 24.75 KG/M2 | SYSTOLIC BLOOD PRESSURE: 118 MMHG

## 2025-07-24 DIAGNOSIS — K21.9 GASTROESOPHAGEAL REFLUX DISEASE WITHOUT ESOPHAGITIS: ICD-10-CM

## 2025-07-24 DIAGNOSIS — M79.601 RIGHT ARM PAIN: ICD-10-CM

## 2025-07-24 DIAGNOSIS — M25.551 RIGHT HIP PAIN: ICD-10-CM

## 2025-07-24 DIAGNOSIS — M54.12 CERVICAL RADICULAR PAIN: Primary | ICD-10-CM

## 2025-07-24 RX ORDER — KETOROLAC TROMETHAMINE 30 MG/ML
60 INJECTION, SOLUTION INTRAMUSCULAR; INTRAVENOUS ONCE
Status: COMPLETED | OUTPATIENT
Start: 2025-07-24 | End: 2025-07-24

## 2025-07-24 RX ORDER — FAMOTIDINE 40 MG/1
40 TABLET, FILM COATED ORAL EVERY EVENING
Qty: 30 TABLET | Refills: 0 | Status: CANCELLED | OUTPATIENT
Start: 2025-07-24

## 2025-07-24 RX ORDER — OMEPRAZOLE 40 MG/1
40 CAPSULE, DELAYED RELEASE ORAL
Qty: 90 CAPSULE | Refills: 4 | Status: SHIPPED | OUTPATIENT
Start: 2025-07-24

## 2025-07-24 RX ORDER — FAMOTIDINE 40 MG/1
40 TABLET, FILM COATED ORAL EVERY EVENING
Qty: 90 TABLET | Refills: 3 | Status: SHIPPED | OUTPATIENT
Start: 2025-07-24 | End: 2025-07-24 | Stop reason: ALTCHOICE

## 2025-07-24 RX ORDER — LIDOCAINE 50 MG/G
3 PATCH TOPICAL DAILY
Qty: 90 PATCH | Refills: 0 | Status: SHIPPED | OUTPATIENT
Start: 2025-07-24 | End: 2025-08-23

## 2025-07-24 RX ORDER — NAPROXEN 500 MG/1
500 TABLET ORAL 2 TIMES DAILY WITH MEALS
Qty: 60 TABLET | Refills: 0 | Status: SHIPPED | OUTPATIENT
Start: 2025-07-24

## 2025-07-24 RX ORDER — OMEPRAZOLE 40 MG/1
40 CAPSULE, DELAYED RELEASE ORAL
Qty: 90 CAPSULE | Refills: 4 | Status: CANCELLED | OUTPATIENT
Start: 2025-07-24

## 2025-07-24 RX ORDER — HYDROCODONE BITARTRATE AND ACETAMINOPHEN 5; 325 MG/1; MG/1
1 TABLET ORAL EVERY 4 HOURS PRN
Qty: 30 TABLET | Refills: 0 | Status: SHIPPED | OUTPATIENT
Start: 2025-07-24 | End: 2025-07-29

## 2025-07-24 RX ORDER — PREDNISONE 20 MG/1
20 TABLET ORAL 2 TIMES DAILY
Qty: 10 TABLET | Refills: 0 | Status: SHIPPED | OUTPATIENT
Start: 2025-07-24 | End: 2025-07-29

## 2025-07-24 RX ADMIN — KETOROLAC TROMETHAMINE 60 MG: 30 INJECTION, SOLUTION INTRAMUSCULAR; INTRAVENOUS at 16:17

## 2025-07-24 NOTE — PATIENT INSTRUCTIONS
Neck Strain or Sprain: Rehab Exercises  Your Care Instructions  Here are some examples of typical rehabilitation exercises for your condition. Start each exercise slowly. Ease off the exercise if you start to have pain.  Your doctor or physical therapist will tell you when you can start these exercises and which ones will work best for you.  How to do the exercises  Neck rotation    Sit in a firm chair, or stand up straight.  Keeping your chin level, turn your head to the right, and hold for 15 to 30 seconds.  Turn your head to the left and hold for 15 to 30 seconds.  Repeat 2 to 4 times to each side.    Neck stretches    Look straight ahead, and tip your right ear to your right shoulder. Do not let your left shoulder rise up as you tip your head to the right.  Hold for 15 to 30 seconds.  Tilt your head to the left. Do not let your right shoulder rise up as you tip your head to the left.  Hold for 15 to 30 seconds.  Repeat 2 to 4 times to each side.    Forward neck flexion    Sit in a firm chair, or stand up straight.  Bend your head forward.  Hold for 15 to 30 seconds.  Repeat 2 to 4 times.    Lateral (side) bend strengthening    With your right hand, place your first two fingers on your right temple.  Start to bend your head to the side while using gentle pressure from your fingers to keep your head from bending.  Hold for about 6 seconds.  Repeat 8 to 12 times.  Switch hands and repeat the same exercise on your left side.    Forward bend strengthening    Place your first two fingers of either hand on your forehead.  Start to bend your head forward while using gentle pressure from your fingers to keep your head from bending.  Hold for about 6 seconds.  Repeat 8 to 12 times.    Neutral position strengthening    Using one hand, place your fingertips on the back of your head at the top of your neck.  Start to bend your head backward while using gentle pressure from your fingers to keep your head from bending.  Hold

## 2025-07-24 NOTE — PROGRESS NOTES
Patient ID: Anna Ibarra 1980    .  Chief Complaint   Patient presents with    Shoulder Pain      18 yrs  has not worked in 30 days todays pain 10/10 right shoulder pain worsening.     Hip Pain    Migraine         HPI    History of Present Illness  The patient is a 45-year-old female presenting with shoulder pain, hip pain, and migraine. She missed her follow-up appointment in March 2025 for headache, GERD, and nicotine addiction.    Right Shoulder Pain  - Radiating to upper back, present for 7-8 months  - Severe pain today, unable to rise from bed  - Tingling and numbness in right hand affecting grasp  - Pain rated 8/10, radiates to elbow and fingers  - No armpit pain or neck spasms  - Using heating pad, lidocaine patch, and Tylenol with partial relief  -     Right Hip Pain  - Radiating to leg, causing occasional locking, present for 7-8 months  - No known injury  - Pain extends to knee, infrequently to ankle  - No bladder or bowel control loss, no leg numbness or tingling  - Limited lidocaine patch use    Missed Follow-Up for Acid Reflux  - Missed 3-week follow-up  - Switched from omeprazole to famotidine  - Stress at work caused heartburn, now less stressed  - Omeprazole preferred    IBS symptoms improved since stopping work.    Alcohol: No longer drinks alcohol.    Patient Active Problem List   Diagnosis    Gastroesophageal reflux disease without esophagitis    Bone spur on posterior portion of calcaneus    History of colon polyps    Tobacco abuse    Allergic rhinitis    Callus of foot       Past Surgical History:   Procedure Laterality Date    DILATION AND CURETTAGE OF UTERUS  02/2017       Family History   Problem Relation Age of Onset    No Known Problems Mother     No Known Problems Father     No Known Problems Sister     No Known Problems Brother     Diabetes Type 1  Daughter     Other (Other) Daughter         pot's syndrome       Current Outpatient Medications on File Prior to Visit

## 2025-07-25 ENCOUNTER — HOSPITAL ENCOUNTER (OUTPATIENT)
Dept: GENERAL RADIOLOGY | Age: 45
Discharge: HOME | End: 2025-07-25
Payer: MEDICAID

## 2025-07-25 DIAGNOSIS — M54.12 CERVICAL RADICULAR PAIN: ICD-10-CM

## 2025-07-25 DIAGNOSIS — M25.551 RIGHT HIP PAIN: ICD-10-CM

## 2025-07-25 DIAGNOSIS — M79.601 RIGHT ARM PAIN: ICD-10-CM

## 2025-07-25 PROCEDURE — 72100 X-RAY EXAM L-S SPINE 2/3 VWS: CPT

## 2025-07-25 PROCEDURE — 72040 X-RAY EXAM NECK SPINE 2-3 VW: CPT

## 2025-07-25 PROCEDURE — 73502 X-RAY EXAM HIP UNI 2-3 VIEWS: CPT

## 2025-07-28 PROBLEM — M47.22 OSTEOARTHRITIS OF SPINE WITH RADICULOPATHY, CERVICAL REGION: Status: ACTIVE | Noted: 2025-07-28

## 2025-07-28 PROBLEM — M16.11 OSTEOARTHRITIS OF RIGHT HIP: Status: ACTIVE | Noted: 2025-07-28

## 2025-07-29 ENCOUNTER — OFFICE VISIT (OUTPATIENT)
Dept: FAMILY MEDICINE CLINIC | Age: 45
End: 2025-07-29

## 2025-07-29 VITALS
HEART RATE: 82 BPM | OXYGEN SATURATION: 98 % | DIASTOLIC BLOOD PRESSURE: 70 MMHG | BODY MASS INDEX: 24.69 KG/M2 | SYSTOLIC BLOOD PRESSURE: 118 MMHG | WEIGHT: 153 LBS

## 2025-07-29 DIAGNOSIS — M47.22 OSTEOARTHRITIS OF SPINE WITH RADICULOPATHY, CERVICAL REGION: ICD-10-CM

## 2025-07-29 DIAGNOSIS — M54.31 RIGHT SCIATIC NERVE PAIN: ICD-10-CM

## 2025-07-29 DIAGNOSIS — M54.12 CERVICAL RADICULAR PAIN: Primary | ICD-10-CM

## 2025-07-29 DIAGNOSIS — M16.11 PRIMARY OSTEOARTHRITIS OF RIGHT HIP: ICD-10-CM

## 2025-07-29 RX ORDER — KETOROLAC TROMETHAMINE 30 MG/ML
60 INJECTION, SOLUTION INTRAMUSCULAR; INTRAVENOUS ONCE
Status: COMPLETED | OUTPATIENT
Start: 2025-07-29 | End: 2025-07-29

## 2025-07-29 RX ADMIN — KETOROLAC TROMETHAMINE 60 MG: 30 INJECTION, SOLUTION INTRAMUSCULAR; INTRAVENOUS at 16:17

## 2025-07-29 SDOH — ECONOMIC STABILITY: FOOD INSECURITY: WITHIN THE PAST 12 MONTHS, THE FOOD YOU BOUGHT JUST DIDN'T LAST AND YOU DIDN'T HAVE MONEY TO GET MORE.: NEVER TRUE

## 2025-07-29 SDOH — ECONOMIC STABILITY: FOOD INSECURITY: WITHIN THE PAST 12 MONTHS, YOU WORRIED THAT YOUR FOOD WOULD RUN OUT BEFORE YOU GOT MONEY TO BUY MORE.: NEVER TRUE

## 2025-07-29 NOTE — PROGRESS NOTES
Patient ID: Anna Ibarra 1980    Chief Complaint   Patient presents with    cervical radiculopathy    Hip Pain       HPI     History of Present Illness  The patient presents for follow-up of cervical radiculopathy.    Cervical Radiculopathy  - Significant pain, rated 8.5/10, radiating down her arm with numbness and tingling in her fingers  - Difficulty with lifting, pulling, and pushing tasks due to being right-handed  - Pain is more severe today than last week  - Stretching exercises and swimming have not been effective  - Lidocaine patches provide some relief  - Has taken prednisone, naproxen, and Vicodin, but only took the steroid today to accurately convey her current state  - On all medications yesterday, including Vicodin and gabapentin, pain decreased to 3  - Pain injection last week was beneficial    Right Hip Pain  - Rated 6.5/10, extending to her knee and occasionally to her calf  - No tingling in the right calf or thigh  - No loss of bladder or bowel control  - Believes movement may help reduce pain    Her teeth have stopped hurting, attributed to clenching at night.    Heartburn and IBS symptoms have resolved.    Occupation:       Patient Active Problem List   Diagnosis    Gastroesophageal reflux disease without esophagitis    Bone spur on posterior portion of calcaneus    History of colon polyps    Tobacco abuse    Allergic rhinitis    Callus of foot    Osteoarthritis of right hip    Osteoarthritis of spine with radiculopathy, cervical region       Past Surgical History:   Procedure Laterality Date    DILATION AND CURETTAGE OF UTERUS  02/2017       Family History   Problem Relation Age of Onset    No Known Problems Mother     No Known Problems Father     No Known Problems Sister     No Known Problems Brother     Diabetes Type 1  Daughter     Other (Other) Daughter         pot's syndrome       Current Outpatient Medications on File Prior to Visit   Medication Sig Dispense Refill

## 2025-08-19 ENCOUNTER — OFFICE VISIT (OUTPATIENT)
Dept: FAMILY MEDICINE CLINIC | Age: 45
End: 2025-08-19
Payer: MEDICAID

## 2025-08-19 VITALS
SYSTOLIC BLOOD PRESSURE: 118 MMHG | DIASTOLIC BLOOD PRESSURE: 76 MMHG | HEART RATE: 100 BPM | OXYGEN SATURATION: 97 % | BODY MASS INDEX: 24.37 KG/M2 | WEIGHT: 151 LBS

## 2025-08-19 DIAGNOSIS — M54.12 CERVICAL RADICULAR PAIN: Primary | ICD-10-CM

## 2025-08-19 DIAGNOSIS — M54.31 RIGHT SCIATIC NERVE PAIN: ICD-10-CM

## 2025-08-19 PROCEDURE — 4004F PT TOBACCO SCREEN RCVD TLK: CPT | Performed by: FAMILY MEDICINE

## 2025-08-19 PROCEDURE — G8420 CALC BMI NORM PARAMETERS: HCPCS | Performed by: FAMILY MEDICINE

## 2025-08-19 PROCEDURE — G8427 DOCREV CUR MEDS BY ELIG CLIN: HCPCS | Performed by: FAMILY MEDICINE

## 2025-08-19 PROCEDURE — 99213 OFFICE O/P EST LOW 20 MIN: CPT | Performed by: FAMILY MEDICINE

## 2025-08-19 ASSESSMENT — ENCOUNTER SYMPTOMS: BACK PAIN: 1

## 2025-08-21 ENCOUNTER — TELEPHONE (OUTPATIENT)
Dept: FAMILY MEDICINE CLINIC | Age: 45
End: 2025-08-21

## 2025-08-21 DIAGNOSIS — M54.12 CERVICAL RADICULAR PAIN: ICD-10-CM

## 2025-08-21 RX ORDER — HYDROCODONE BITARTRATE AND ACETAMINOPHEN 5; 325 MG/1; MG/1
1 TABLET ORAL EVERY 4 HOURS PRN
Qty: 20 TABLET | Refills: 0 | Status: SHIPPED | OUTPATIENT
Start: 2025-08-21 | End: 2025-08-26

## 2025-08-26 ENCOUNTER — HOSPITAL ENCOUNTER (OUTPATIENT)
Dept: MRI IMAGING | Age: 45
Discharge: HOME | End: 2025-08-26
Attending: FAMILY MEDICINE
Payer: MEDICAID

## 2025-08-26 DIAGNOSIS — M54.12 CERVICAL RADICULAR PAIN: ICD-10-CM

## 2025-08-26 PROCEDURE — 72141 MRI NECK SPINE W/O DYE: CPT

## 2025-08-27 ENCOUNTER — HOSPITAL ENCOUNTER (OUTPATIENT)
Dept: PHYSICAL THERAPY | Age: 45
Setting detail: THERAPIES SERIES
Discharge: HOME | End: 2025-08-27